# Patient Record
Sex: MALE | Race: WHITE | NOT HISPANIC OR LATINO | URBAN - METROPOLITAN AREA
[De-identification: names, ages, dates, MRNs, and addresses within clinical notes are randomized per-mention and may not be internally consistent; named-entity substitution may affect disease eponyms.]

---

## 2019-05-07 PROBLEM — Z00.00 ENCOUNTER FOR PREVENTIVE HEALTH EXAMINATION: Status: ACTIVE | Noted: 2019-05-07

## 2019-05-09 ENCOUNTER — OUTPATIENT (OUTPATIENT)
Dept: OUTPATIENT SERVICES | Facility: HOSPITAL | Age: 69
LOS: 1 days | End: 2019-05-09
Payer: MEDICARE

## 2019-05-09 DIAGNOSIS — M54.16 RADICULOPATHY, LUMBAR REGION: ICD-10-CM

## 2019-05-09 PROCEDURE — 62323 NJX INTERLAMINAR LMBR/SAC: CPT

## 2019-05-09 PROCEDURE — 77003 FLUOROGUIDE FOR SPINE INJECT: CPT

## 2025-01-07 ENCOUNTER — LABORATORY RESULT (OUTPATIENT)
Age: 75
End: 2025-01-07

## 2025-01-07 ENCOUNTER — APPOINTMENT (OUTPATIENT)
Dept: ORTHOPEDIC SURGERY | Facility: CLINIC | Age: 75
End: 2025-01-07
Payer: MEDICARE

## 2025-01-07 VITALS — BODY MASS INDEX: 27.09 KG/M2 | WEIGHT: 200 LBS | HEIGHT: 72 IN

## 2025-01-07 PROCEDURE — 99204 OFFICE O/P NEW MOD 45 MIN: CPT

## 2025-01-07 PROCEDURE — 73080 X-RAY EXAM OF ELBOW: CPT | Mod: LT

## 2025-01-07 RX ORDER — DOXYCYCLINE HYCLATE 100 MG/1
100 TABLET ORAL TWICE DAILY
Qty: 20 | Refills: 0 | Status: ACTIVE | COMMUNITY
Start: 2025-01-07 | End: 1900-01-01

## 2025-01-09 ENCOUNTER — NON-APPOINTMENT (OUTPATIENT)
Age: 75
End: 2025-01-09

## 2025-01-10 ENCOUNTER — NON-APPOINTMENT (OUTPATIENT)
Age: 75
End: 2025-01-10

## 2025-01-13 ENCOUNTER — NON-APPOINTMENT (OUTPATIENT)
Age: 75
End: 2025-01-13

## 2025-01-13 LAB — GRAM STN ASPIRATE: ABNORMAL

## 2025-01-14 ENCOUNTER — APPOINTMENT (OUTPATIENT)
Dept: ORTHOPEDIC SURGERY | Facility: CLINIC | Age: 75
End: 2025-01-14
Payer: MEDICARE

## 2025-01-14 VITALS — BODY MASS INDEX: 27.09 KG/M2 | WEIGHT: 200 LBS | HEIGHT: 72 IN

## 2025-01-14 PROBLEM — M71.122 SEPTIC OLECRANON BURSITIS OF LEFT ELBOW: Status: ACTIVE | Noted: 2025-01-07

## 2025-01-14 PROCEDURE — 99214 OFFICE O/P EST MOD 30 MIN: CPT | Mod: 25

## 2025-01-14 RX ORDER — DOXYCYCLINE HYCLATE 100 MG/1
100 TABLET ORAL TWICE DAILY
Qty: 28 | Refills: 0 | Status: ACTIVE | COMMUNITY
Start: 2025-01-14 | End: 1900-01-01

## 2025-01-16 ENCOUNTER — NON-APPOINTMENT (OUTPATIENT)
Age: 75
End: 2025-01-16

## 2025-01-21 ENCOUNTER — APPOINTMENT (OUTPATIENT)
Dept: ORTHOPEDIC SURGERY | Facility: CLINIC | Age: 75
End: 2025-01-21
Payer: MEDICARE

## 2025-01-21 VITALS — WEIGHT: 200 LBS | BODY MASS INDEX: 27.09 KG/M2 | HEIGHT: 72 IN

## 2025-01-21 DIAGNOSIS — M71.122 OTHER INFECTIVE BURSITIS, LEFT ELBOW: ICD-10-CM

## 2025-01-21 PROCEDURE — 99214 OFFICE O/P EST MOD 30 MIN: CPT | Mod: 25

## 2025-01-22 ENCOUNTER — OUTPATIENT (OUTPATIENT)
Dept: OUTPATIENT SERVICES | Facility: HOSPITAL | Age: 75
LOS: 1 days | End: 2025-01-22

## 2025-01-22 VITALS
DIASTOLIC BLOOD PRESSURE: 73 MMHG | TEMPERATURE: 97 F | HEIGHT: 72 IN | OXYGEN SATURATION: 100 % | HEART RATE: 84 BPM | SYSTOLIC BLOOD PRESSURE: 106 MMHG | RESPIRATION RATE: 16 BRPM | WEIGHT: 203.93 LBS

## 2025-01-22 DIAGNOSIS — Z96.653 PRESENCE OF ARTIFICIAL KNEE JOINT, BILATERAL: Chronic | ICD-10-CM

## 2025-01-22 DIAGNOSIS — Z96.611 PRESENCE OF RIGHT ARTIFICIAL SHOULDER JOINT: Chronic | ICD-10-CM

## 2025-01-22 DIAGNOSIS — R06.83 SNORING: ICD-10-CM

## 2025-01-22 DIAGNOSIS — M71.122 OTHER INFECTIVE BURSITIS, LEFT ELBOW: ICD-10-CM

## 2025-01-22 DIAGNOSIS — M70.22 OLECRANON BURSITIS, LEFT ELBOW: ICD-10-CM

## 2025-01-22 LAB
HCT VFR BLD CALC: 42.8 % — SIGNIFICANT CHANGE UP (ref 39–50)
HGB BLD-MCNC: 13.9 G/DL — SIGNIFICANT CHANGE UP (ref 13–17)
MCHC RBC-ENTMCNC: 30.6 PG — SIGNIFICANT CHANGE UP (ref 27–34)
MCHC RBC-ENTMCNC: 32.5 G/DL — SIGNIFICANT CHANGE UP (ref 32–36)
MCV RBC AUTO: 94.3 FL — SIGNIFICANT CHANGE UP (ref 80–100)
NRBC # BLD AUTO: 0 K/UL — SIGNIFICANT CHANGE UP (ref 0–0)
NRBC # BLD: 0 /100 WBCS — SIGNIFICANT CHANGE UP (ref 0–0)
NRBC # FLD: 0 K/UL — SIGNIFICANT CHANGE UP (ref 0–0)
NRBC BLD-RTO: 0 /100 WBCS — SIGNIFICANT CHANGE UP (ref 0–0)
PLATELET # BLD AUTO: 190 K/UL — SIGNIFICANT CHANGE UP (ref 150–400)
RBC # BLD: 4.54 M/UL — SIGNIFICANT CHANGE UP (ref 4.2–5.8)
RBC # FLD: 14.2 % — SIGNIFICANT CHANGE UP (ref 10.3–14.5)
WBC # BLD: 13.2 K/UL — HIGH (ref 3.8–10.5)
WBC # FLD AUTO: 13.2 K/UL — HIGH (ref 3.8–10.5)

## 2025-01-22 NOTE — H&P PST ADULT - NEGATIVE NEUROLOGICAL SYMPTOMS
no transient paralysis/no weakness/no generalized seizures/no focal seizures/no syncope/no tremors/no vertigo/no loss of sensation/no difficulty walking/no headache/no loss of consciousness/no hemiparesis/no facial palsy

## 2025-01-22 NOTE — H&P PST ADULT - PROBLEM SELECTOR PLAN 1
Scheduled for left elbow olecranon incision and drainage  Preop instructions provided and patient verbalizes understanding.  Hibiclens and Famotidine provided with instructions.   CBC, results  pending      Hypertension- pt instructed to take Lisinopril-HCTZ and amlodipine am of surgery  Rheumatoid arthritis- Pt will take Duloxetine Pregabalin, methylprednisolone am of surgery

## 2025-01-22 NOTE — H&P PST ADULT - HISTORY OF PRESENT ILLNESS
73yo male Pt c/o swelling and pain to left elbow 4 weeks . Pt  went to Urgent care initally and  received antibiotic (doxycycline).  Pt reports  pain and swelling increase, he then decide to get evaluated by s surgeon. 1/10/2025 elbow aspiration done -Moderate Methicillin Resistant Staphylococcus aureus. Doxycycline was re-prescribed to the patient. and he f/u with surgeon.  Pt then opted for surgery at f/u visit.  Pt Now present in Presurgical testing for preop evaluation for scheduled procedure left elbow Olecranon incision and drainage.   ?

## 2025-01-22 NOTE — H&P PST ADULT - NEGATIVE ENMT SYMPTOMS
no ear pain/no vertigo/no sinus symptoms/no nasal congestion/no nasal discharge/no nasal obstruction/no post-nasal discharge/no nose bleeds/no recurrent cold sores/no abnormal taste sensation/no gum bleeding/no throat pain/no dysphagia

## 2025-01-22 NOTE — H&P PST ADULT - NSICDXPASTMEDICALHX_GEN_ALL_CORE_FT
PAST MEDICAL HISTORY:  Arthritis, rheumatoid     Dyslipidemia     H/O vitamin D deficiency     History of BPH     History of frostbite     Hypertension     Olecranon bursitis, left elbow

## 2025-01-22 NOTE — H&P PST ADULT - NSANTHOSAYNRD_GEN_A_CORE
No. VALERIO screening performed.  STOP BANG Legend: 0-2 = LOW Risk; 3-4 = INTERMEDIATE Risk; 5-8 = HIGH Risk

## 2025-01-22 NOTE — H&P PST ADULT - NSICDXPASTSURGICALHX_GEN_ALL_CORE_FT
PAST SURGICAL HISTORY:  History of bilateral knee replacement     S/P shoulder replacement, right

## 2025-01-22 NOTE — H&P PST ADULT - MUSCULOSKELETAL COMMENTS
left knee replacement  2009, left knee revision 2014, right knee replacement 2017, right shoulder replacement 2015 swelling and redness to left elbow dressing with small yellowish drainage, deformity noted to 1st metatarsal  bilateral hand

## 2025-01-23 ENCOUNTER — TRANSCRIPTION ENCOUNTER (OUTPATIENT)
Age: 75
End: 2025-01-23

## 2025-01-23 ENCOUNTER — OUTPATIENT (OUTPATIENT)
Dept: OUTPATIENT SERVICES | Facility: HOSPITAL | Age: 75
LOS: 1 days | Discharge: HOME | End: 2025-01-23
Payer: MEDICARE

## 2025-01-23 ENCOUNTER — NON-APPOINTMENT (OUTPATIENT)
Age: 75
End: 2025-01-23

## 2025-01-23 ENCOUNTER — APPOINTMENT (OUTPATIENT)
Dept: ORTHOPEDIC SURGERY | Facility: AMBULATORY SURGERY CENTER | Age: 75
End: 2025-01-23

## 2025-01-23 VITALS
TEMPERATURE: 98 F | SYSTOLIC BLOOD PRESSURE: 114 MMHG | DIASTOLIC BLOOD PRESSURE: 59 MMHG | HEART RATE: 82 BPM | RESPIRATION RATE: 16 BRPM | OXYGEN SATURATION: 99 %

## 2025-01-23 VITALS
WEIGHT: 203.93 LBS | RESPIRATION RATE: 16 BRPM | TEMPERATURE: 99 F | OXYGEN SATURATION: 100 % | SYSTOLIC BLOOD PRESSURE: 113 MMHG | HEART RATE: 84 BPM | DIASTOLIC BLOOD PRESSURE: 73 MMHG | HEIGHT: 72 IN

## 2025-01-23 DIAGNOSIS — M71.122 OTHER INFECTIVE BURSITIS, LEFT ELBOW: ICD-10-CM

## 2025-01-23 DIAGNOSIS — Z96.653 PRESENCE OF ARTIFICIAL KNEE JOINT, BILATERAL: Chronic | ICD-10-CM

## 2025-01-23 DIAGNOSIS — Z96.611 PRESENCE OF RIGHT ARTIFICIAL SHOULDER JOINT: Chronic | ICD-10-CM

## 2025-01-23 LAB
GRAM STN FLD: SIGNIFICANT CHANGE UP
HEMOLYSIS INDEX: 5 — SIGNIFICANT CHANGE UP
POTASSIUM SERPL-MCNC: 4 MMOL/L — SIGNIFICANT CHANGE UP (ref 3.5–5.3)
POTASSIUM SERPL-SCNC: 4 MMOL/L — SIGNIFICANT CHANGE UP (ref 3.5–5.3)
SPECIMEN SOURCE: SIGNIFICANT CHANGE UP

## 2025-01-23 PROCEDURE — 88304 TISSUE EXAM BY PATHOLOGIST: CPT | Mod: 26

## 2025-01-23 PROCEDURE — 24105 EXCISION OLECRANON BURSA: CPT | Mod: RT

## 2025-01-23 RX ORDER — DUTASTERIDE 0.5 MG/1
1 CAPSULE, LIQUID FILLED ORAL
Refills: 0 | DISCHARGE

## 2025-01-23 RX ORDER — METHYLPREDNISOLONE ACETATE 40 MG/ML
3 VIAL (ML) INJECTION
Refills: 0 | DISCHARGE

## 2025-01-23 RX ORDER — TAMSULOSIN HYDROCHLORIDE 0.4 MG/1
2 CAPSULE ORAL
Refills: 0 | DISCHARGE

## 2025-01-23 RX ORDER — DOXYCYCLINE HYCLATE 100 MG/1
1 CAPSULE ORAL
Refills: 0 | DISCHARGE

## 2025-01-23 RX ORDER — ASPIRIN 81 MG/1
1 TABLET, COATED ORAL
Refills: 0 | DISCHARGE

## 2025-01-23 RX ORDER — DULOXETINE 20 MG/1
2 CAPSULE, DELAYED RELEASE ORAL
Refills: 0 | DISCHARGE

## 2025-01-23 RX ORDER — AMLODIPINE BESYLATE 5 MG
1 TABLET ORAL
Refills: 0 | DISCHARGE

## 2025-01-23 RX ORDER — PREGABALIN CAPSULES, CV 225 MG/1
1 CAPSULE ORAL
Refills: 0 | DISCHARGE

## 2025-01-23 RX ORDER — LISINOPRIL AND HYDROCHLOROTHIAZIDE 25; 20 MG/1; MG/1
1 TABLET ORAL
Refills: 0 | DISCHARGE

## 2025-01-23 RX ORDER — ROSUVASTATIN CALCIUM 10 MG/1
1 TABLET, FILM COATED ORAL
Refills: 0 | DISCHARGE

## 2025-01-23 NOTE — ASU DISCHARGE PLAN (ADULT/PEDIATRIC) - CARE PROVIDER_API CALL
Karl Abraham  Surgery [facility]  [address], [address]  [place] [address]  Phone: [phone]  Fax: [phone]  Follow Up Time: 2 weeks

## 2025-01-23 NOTE — ASU DISCHARGE PLAN (ADULT/PEDIATRIC) - ASU DC SPECIAL INSTRUCTIONSFT
Please see handout from Dr. Abraham for specific instructions including follow up. If you are supposed to take any specific medications postoperatively, they have already been sent to your pharmacy.

## 2025-01-23 NOTE — ASU DISCHARGE PLAN (ADULT/PEDIATRIC) - NS MD DC FALL RISK RISK
For information on Fall & Injury Prevention, visit: [email or web address] OR  [email or web address] OR  https://www.cdc.gov/steadi/patient.html

## 2025-01-23 NOTE — ASU PREOPERATIVE ASSESSMENT, ADULT (IPARK ONLY) - FALL HARM RISK - RISK INTERVENTIONS

## 2025-01-23 NOTE — ASU DISCHARGE PLAN (ADULT/PEDIATRIC) - FINANCIAL ASSISTANCE
[facility] provides services at a reduced cost to those who are determined to be eligible through [facility]’s financial assistance program. Information regarding [facility]’s financial assistance program can be found by going to [email or web address] or by calling [phone].

## 2025-01-24 LAB
NIGHT BLUE STAIN TISS: SIGNIFICANT CHANGE UP
SPECIMEN SOURCE: SIGNIFICANT CHANGE UP

## 2025-01-26 LAB — GRAM STN FLD: ABNORMAL

## 2025-01-28 LAB
-  CLINDAMYCIN: SIGNIFICANT CHANGE UP
-  DAPTOMYCIN: SIGNIFICANT CHANGE UP
-  ERYTHROMYCIN: SIGNIFICANT CHANGE UP
-  GENTAMICIN: SIGNIFICANT CHANGE UP
-  LINEZOLID: SIGNIFICANT CHANGE UP
-  OXACILLIN: SIGNIFICANT CHANGE UP
-  PENICILLIN: SIGNIFICANT CHANGE UP
-  RIFAMPIN: SIGNIFICANT CHANGE UP
-  TETRACYCLINE: SIGNIFICANT CHANGE UP
-  TRIMETHOPRIM/SULFAMETHOXAZOLE: SIGNIFICANT CHANGE UP
-  VANCOMYCIN: SIGNIFICANT CHANGE UP
CULTURE RESULTS: ABNORMAL
METHOD TYPE: SIGNIFICANT CHANGE UP
ORGANISM # SPEC MICROSCOPIC CNT: ABNORMAL
ORGANISM # SPEC MICROSCOPIC CNT: ABNORMAL
SPECIMEN SOURCE: SIGNIFICANT CHANGE UP
SPECIMEN SOURCE: SIGNIFICANT CHANGE UP

## 2025-01-30 ENCOUNTER — NON-APPOINTMENT (OUTPATIENT)
Age: 75
End: 2025-01-30

## 2025-02-03 LAB — SURGICAL PATHOLOGY STUDY: SIGNIFICANT CHANGE UP

## 2025-02-06 ENCOUNTER — NON-APPOINTMENT (OUTPATIENT)
Age: 75
End: 2025-02-06

## 2025-02-07 ENCOUNTER — APPOINTMENT (OUTPATIENT)
Dept: ORTHOPEDIC SURGERY | Facility: CLINIC | Age: 75
End: 2025-02-07
Payer: MEDICARE

## 2025-02-07 DIAGNOSIS — M71.122 OTHER INFECTIVE BURSITIS, LEFT ELBOW: ICD-10-CM

## 2025-02-07 DIAGNOSIS — A49.02 METHICILLIN RESISTANT STAPHYLOCOCCUS AUREUS INFECTION, UNSPECIFIED SITE: ICD-10-CM

## 2025-02-07 PROBLEM — Z87.438 PERSONAL HISTORY OF OTHER DISEASES OF MALE GENITAL ORGANS: Chronic | Status: ACTIVE | Noted: 2025-01-22

## 2025-02-07 PROBLEM — M70.22 OLECRANON BURSITIS, LEFT ELBOW: Chronic | Status: ACTIVE | Noted: 2025-01-22

## 2025-02-07 LAB — CULTURE RESULTS: ABNORMAL

## 2025-02-07 PROCEDURE — 99024 POSTOP FOLLOW-UP VISIT: CPT

## 2025-02-07 RX ORDER — DOXYCYCLINE HYCLATE 100 MG/1
100 TABLET ORAL TWICE DAILY
Qty: 20 | Refills: 0 | Status: ACTIVE | COMMUNITY
Start: 2025-02-07 | End: 1900-01-01

## 2025-02-08 PROBLEM — Z91.89 OTHER SPECIFIED PERSONAL RISK FACTORS, NOT ELSEWHERE CLASSIFIED: Chronic | Status: ACTIVE | Noted: 2025-01-22

## 2025-02-08 PROBLEM — Z86.39 PERSONAL HISTORY OF OTHER ENDOCRINE, NUTRITIONAL AND METABOLIC DISEASE: Chronic | Status: ACTIVE | Noted: 2025-01-22

## 2025-02-08 PROBLEM — I10 ESSENTIAL (PRIMARY) HYPERTENSION: Chronic | Status: ACTIVE | Noted: 2025-01-22

## 2025-02-08 PROBLEM — M06.9 RHEUMATOID ARTHRITIS, UNSPECIFIED: Chronic | Status: ACTIVE | Noted: 2025-01-22

## 2025-02-08 PROBLEM — E78.5 HYPERLIPIDEMIA, UNSPECIFIED: Chronic | Status: ACTIVE | Noted: 2025-01-22

## 2025-02-13 ENCOUNTER — NON-APPOINTMENT (OUTPATIENT)
Age: 75
End: 2025-02-13

## 2025-02-21 ENCOUNTER — APPOINTMENT (OUTPATIENT)
Dept: ORTHOPEDIC SURGERY | Facility: CLINIC | Age: 75
End: 2025-02-21
Payer: MEDICARE

## 2025-02-21 DIAGNOSIS — M71.122 OTHER INFECTIVE BURSITIS, LEFT ELBOW: ICD-10-CM

## 2025-02-21 PROCEDURE — 99024 POSTOP FOLLOW-UP VISIT: CPT

## 2025-02-24 ENCOUNTER — NON-APPOINTMENT (OUTPATIENT)
Age: 75
End: 2025-02-24

## 2025-07-03 ENCOUNTER — INPATIENT (INPATIENT)
Facility: HOSPITAL | Age: 75
LOS: 0 days | Discharge: HOME | DRG: 125 | End: 2025-07-04
Attending: PSYCHIATRY & NEUROLOGY | Admitting: PSYCHIATRY & NEUROLOGY
Payer: MEDICARE

## 2025-07-03 VITALS
TEMPERATURE: 98 F | HEIGHT: 76 IN | WEIGHT: 203.93 LBS | SYSTOLIC BLOOD PRESSURE: 109 MMHG | OXYGEN SATURATION: 96 % | RESPIRATION RATE: 20 BRPM | HEART RATE: 89 BPM | DIASTOLIC BLOOD PRESSURE: 70 MMHG

## 2025-07-03 DIAGNOSIS — Z96.653 PRESENCE OF ARTIFICIAL KNEE JOINT, BILATERAL: Chronic | ICD-10-CM

## 2025-07-03 DIAGNOSIS — Z96.611 PRESENCE OF RIGHT ARTIFICIAL SHOULDER JOINT: Chronic | ICD-10-CM

## 2025-07-03 DIAGNOSIS — H34.12 CENTRAL RETINAL ARTERY OCCLUSION, LEFT EYE: ICD-10-CM

## 2025-07-03 LAB
ADD ON TEST-SPECIMEN IN LAB: SIGNIFICANT CHANGE UP
ALBUMIN SERPL ELPH-MCNC: 3.8 G/DL — SIGNIFICANT CHANGE UP (ref 3.3–5)
ALP SERPL-CCNC: 81 U/L — SIGNIFICANT CHANGE UP (ref 40–120)
ALT FLD-CCNC: 19 U/L — SIGNIFICANT CHANGE UP (ref 10–45)
ANION GAP SERPL CALC-SCNC: 12 MMOL/L — SIGNIFICANT CHANGE UP (ref 5–17)
APTT BLD: 32.3 SEC — SIGNIFICANT CHANGE UP (ref 26.1–36.8)
AST SERPL-CCNC: 18 U/L — SIGNIFICANT CHANGE UP (ref 10–40)
BASOPHILS # BLD AUTO: 0.07 K/UL — SIGNIFICANT CHANGE UP (ref 0–0.2)
BASOPHILS NFR BLD AUTO: 1.2 % — SIGNIFICANT CHANGE UP (ref 0–2)
BILIRUB SERPL-MCNC: 0.5 MG/DL — SIGNIFICANT CHANGE UP (ref 0.2–1.2)
BUN SERPL-MCNC: 8 MG/DL — SIGNIFICANT CHANGE UP (ref 7–23)
CALCIUM SERPL-MCNC: 9.3 MG/DL — SIGNIFICANT CHANGE UP (ref 8.4–10.5)
CHLORIDE SERPL-SCNC: 107 MMOL/L — SIGNIFICANT CHANGE UP (ref 96–108)
CO2 SERPL-SCNC: 22 MMOL/L — SIGNIFICANT CHANGE UP (ref 22–31)
CREAT SERPL-MCNC: 0.82 MG/DL — SIGNIFICANT CHANGE UP (ref 0.5–1.3)
EGFR: 93 ML/MIN/1.73M2 — SIGNIFICANT CHANGE UP
EGFR: 93 ML/MIN/1.73M2 — SIGNIFICANT CHANGE UP
EOSINOPHIL # BLD AUTO: 0.28 K/UL — SIGNIFICANT CHANGE UP (ref 0–0.5)
EOSINOPHIL NFR BLD AUTO: 4.9 % — SIGNIFICANT CHANGE UP (ref 0–6)
GLUCOSE SERPL-MCNC: 110 MG/DL — HIGH (ref 70–99)
HCT VFR BLD CALC: 42.4 % — SIGNIFICANT CHANGE UP (ref 39–50)
HGB BLD-MCNC: 13.8 G/DL — SIGNIFICANT CHANGE UP (ref 13–17)
IMM GRANULOCYTES # BLD AUTO: 0.01 K/UL — SIGNIFICANT CHANGE UP (ref 0–0.07)
IMM GRANULOCYTES NFR BLD AUTO: 0.2 % — SIGNIFICANT CHANGE UP (ref 0–0.9)
INR BLD: 1.04 RATIO — SIGNIFICANT CHANGE UP (ref 0.85–1.16)
LYMPHOCYTES # BLD AUTO: 1.78 K/UL — SIGNIFICANT CHANGE UP (ref 1–3.3)
LYMPHOCYTES NFR BLD AUTO: 31.3 % — SIGNIFICANT CHANGE UP (ref 13–44)
MCHC RBC-ENTMCNC: 29.9 PG — SIGNIFICANT CHANGE UP (ref 27–34)
MCHC RBC-ENTMCNC: 32.5 G/DL — SIGNIFICANT CHANGE UP (ref 32–36)
MCV RBC AUTO: 92 FL — SIGNIFICANT CHANGE UP (ref 80–100)
MONOCYTES # BLD AUTO: 0.54 K/UL — SIGNIFICANT CHANGE UP (ref 0–0.9)
MONOCYTES NFR BLD AUTO: 9.5 % — SIGNIFICANT CHANGE UP (ref 2–14)
NEUTROPHILS # BLD AUTO: 3.01 K/UL — SIGNIFICANT CHANGE UP (ref 1.8–7.4)
NEUTROPHILS NFR BLD AUTO: 52.9 % — SIGNIFICANT CHANGE UP (ref 43–77)
NRBC # BLD AUTO: 0 K/UL — SIGNIFICANT CHANGE UP (ref 0–0)
NRBC # FLD: 0 K/UL — SIGNIFICANT CHANGE UP (ref 0–0)
NRBC BLD AUTO-RTO: 0 /100 WBCS — SIGNIFICANT CHANGE UP (ref 0–0)
PLATELET # BLD AUTO: 211 K/UL — SIGNIFICANT CHANGE UP (ref 150–400)
PMV BLD: 10.5 FL — SIGNIFICANT CHANGE UP (ref 7–13)
POTASSIUM SERPL-MCNC: 3.5 MMOL/L — SIGNIFICANT CHANGE UP (ref 3.5–5.3)
POTASSIUM SERPL-SCNC: 3.5 MMOL/L — SIGNIFICANT CHANGE UP (ref 3.5–5.3)
PROT SERPL-MCNC: 6.6 G/DL — SIGNIFICANT CHANGE UP (ref 6–8.3)
PROTHROM AB SERPL-ACNC: 11.9 SEC — SIGNIFICANT CHANGE UP (ref 9.9–13.4)
RBC # BLD: 4.61 M/UL — SIGNIFICANT CHANGE UP (ref 4.2–5.8)
RBC # FLD: 12.8 % — SIGNIFICANT CHANGE UP (ref 10.3–14.5)
SODIUM SERPL-SCNC: 141 MMOL/L — SIGNIFICANT CHANGE UP (ref 135–145)
TROPONIN T, HIGH SENSITIVITY RESULT: 37 NG/L — SIGNIFICANT CHANGE UP (ref 0–51)
WBC # BLD: 5.69 K/UL — SIGNIFICANT CHANGE UP (ref 3.8–10.5)
WBC # FLD AUTO: 5.69 K/UL — SIGNIFICANT CHANGE UP (ref 3.8–10.5)

## 2025-07-03 PROCEDURE — 70496 CT ANGIOGRAPHY HEAD: CPT

## 2025-07-03 PROCEDURE — 84132 ASSAY OF SERUM POTASSIUM: CPT

## 2025-07-03 PROCEDURE — 84295 ASSAY OF SERUM SODIUM: CPT

## 2025-07-03 PROCEDURE — 85025 COMPLETE CBC W/AUTO DIFF WBC: CPT

## 2025-07-03 PROCEDURE — 86140 C-REACTIVE PROTEIN: CPT

## 2025-07-03 PROCEDURE — 0042T: CPT

## 2025-07-03 PROCEDURE — 85014 HEMATOCRIT: CPT

## 2025-07-03 PROCEDURE — 82435 ASSAY OF BLOOD CHLORIDE: CPT

## 2025-07-03 PROCEDURE — 82330 ASSAY OF CALCIUM: CPT

## 2025-07-03 PROCEDURE — 84484 ASSAY OF TROPONIN QUANT: CPT

## 2025-07-03 PROCEDURE — 70496 CT ANGIOGRAPHY HEAD: CPT | Mod: 26

## 2025-07-03 PROCEDURE — 80053 COMPREHEN METABOLIC PANEL: CPT

## 2025-07-03 PROCEDURE — 70498 CT ANGIOGRAPHY NECK: CPT | Mod: 26

## 2025-07-03 PROCEDURE — 82962 GLUCOSE BLOOD TEST: CPT

## 2025-07-03 PROCEDURE — 85610 PROTHROMBIN TIME: CPT

## 2025-07-03 PROCEDURE — 82803 BLOOD GASES ANY COMBINATION: CPT

## 2025-07-03 PROCEDURE — 83605 ASSAY OF LACTIC ACID: CPT

## 2025-07-03 PROCEDURE — 70450 CT HEAD/BRAIN W/O DYE: CPT | Mod: 26,XU

## 2025-07-03 PROCEDURE — 70498 CT ANGIOGRAPHY NECK: CPT

## 2025-07-03 PROCEDURE — 70450 CT HEAD/BRAIN W/O DYE: CPT

## 2025-07-03 PROCEDURE — 99285 EMERGENCY DEPT VISIT HI MDM: CPT

## 2025-07-03 PROCEDURE — 82947 ASSAY GLUCOSE BLOOD QUANT: CPT

## 2025-07-03 PROCEDURE — 85018 HEMOGLOBIN: CPT

## 2025-07-03 PROCEDURE — 85730 THROMBOPLASTIN TIME PARTIAL: CPT

## 2025-07-03 RX ORDER — DULOXETINE 20 MG/1
120 CAPSULE, DELAYED RELEASE ORAL
Refills: 0 | Status: DISCONTINUED | OUTPATIENT
Start: 2025-07-03 | End: 2025-07-04

## 2025-07-03 RX ORDER — TAMSULOSIN HYDROCHLORIDE 0.4 MG/1
0.8 CAPSULE ORAL AT BEDTIME
Refills: 0 | Status: DISCONTINUED | OUTPATIENT
Start: 2025-07-03 | End: 2025-07-04

## 2025-07-03 RX ORDER — PREGABALIN 50 MG/1
200 CAPSULE ORAL
Refills: 0 | Status: DISCONTINUED | OUTPATIENT
Start: 2025-07-03 | End: 2025-07-04

## 2025-07-03 RX ORDER — ATORVASTATIN CALCIUM 80 MG/1
80 TABLET, FILM COATED ORAL AT BEDTIME
Refills: 0 | Status: DISCONTINUED | OUTPATIENT
Start: 2025-07-03 | End: 2025-07-04

## 2025-07-03 RX ORDER — ENOXAPARIN SODIUM 100 MG/ML
40 INJECTION SUBCUTANEOUS EVERY 24 HOURS
Refills: 0 | Status: DISCONTINUED | OUTPATIENT
Start: 2025-07-03 | End: 2025-07-04

## 2025-07-03 RX ORDER — ASPIRIN 325 MG
81 TABLET ORAL DAILY
Refills: 0 | Status: DISCONTINUED | OUTPATIENT
Start: 2025-07-03 | End: 2025-07-04

## 2025-07-03 NOTE — H&P ADULT - ATTENDING COMMENTS
Impression: Left eye vision loss–persistent, likely secondary to left central retinal artery occlusion  Past medical history significant for CAD s/p stent 3 weeks prior  Currently on dual antiplatelet  Vascular imaging significant for bilateral carotid severe calcification, right ICA 50 to 60% stenosis and left ICA moderate–40 to 50% stenosis  High-dose statins  On CTA there is findings suggestive of left P3 clot, no obvious atrial fibrillation on telemetry  MRI can be done inpatient or outpatient as  secondary stroke prevention strategy will remain the same as above  Suggest close follow-up with ophthalmology, cardiology and stroke neurology as outpatient.

## 2025-07-03 NOTE — ED ADULT TRIAGE NOTE - BEFAST FACE
Patient refer by Dr Tijerina due to external hemorrhoids that itch and hurts on and off for a yr and she wants them removed. She has used preparation H and something else OTC but continued to itch and hurt. No bleeding with BM.  No constipation or diarrhea. No pain with BM. She is having form stools daily.   colonoscopy 11/2022: Diverticulosis in the sigmoid colon and in the descending colon. - Erythematous mucosa in the sigmoid colon. Biopsied. No

## 2025-07-03 NOTE — CONSULT NOTE ADULT - SUBJECTIVE AND OBJECTIVE BOX
[facility] DEPARTMENT OF OPHTHALMOLOGY - INITIAL ADULT CONSULT  -----------------------------------------------------------------------------  Grupo Mahajan MD, PGY-3  Contact: TEAMS  -----------------------------------------------------------------------------    HPI: 74M w/ hx of Rheumatoid arthritis and resultant peripheral neuropathy, CAD s/p stent 3 weeks prior, and HLD presenting as code stroke for vision loss. Patient reports waking up fine at 8AM by 9AM he was unable to see from his left eye which was not improving. Right eye unaffected. Otherwise patient denies any other weakness, sensory changes, discoordination. Of note the patient had a cardiac stent placed 3 weeks ago and has been taking Aspirin + Plavix. Reports that he has a known history of prior chronic infarct that was discovered ~6 months prior incidentally which began his current extensive cardiac workup.    PAST MEDICAL & SURGICAL HISTORY:  Dyslipidemia  History of BPH  H/O vitamin D deficiency  Arthritis, rheumatoid  Hypertension  History of frostbite  Olecranon bursitis, left elbow  History of bilateral knee replacement  S/P shoulder replacement, right    Interval History: Vision loss around 8-9AM this morning    PMH: As above  POcHx: denies surg/laser  FH: denies glc/amd  Social History: denies etoh/tobacco  Ophthalmic Medications: none  Allergies: NKDA    Review of Systems:  Constitutional: No fever, chills  Eyes: No blurry vision, flashes, floaters, FBS, erythema, discharge, double vision, OU  Neuro: No tremors  Cardiovascular: No chest pain, palpitations  Respiratory: No SOB, no cough  GI: No nausea, vomiting, abdominal pain  : No dysuria  Skin: no rash  Psych: no depression  Endocrine: no polyuria, polydipsia  Heme/lymph: no swelling    VITALS: T(C): 36.7 (07-03-25 @ 20:16)  T(F): 98 (07-03-25 @ 20:16), Max: 98 (07-03-25 @ 20:16)  HR: 87 (07-03-25 @ 20:16) (87 - 89)  BP: 154/80 (07-03-25 @ 20:16) (109/70 - 154/80)  RR:  (19 - 20)  SpO2:  (96% - 97%)  Wt(kg): --  General: AAO x 3, appropriate mood and affect    Ophthalmology Exam:  Visual acuity (sc): 20/40 pH 20/30 OD, HM temporally OS, otherwise LP OS  Pupils: + APD OS  Ttono: 17 OD 14 OS  Extraocular movements (EOMs): Full OU, no pain, no diplopia  Confrontational Visual Field (CVF): Full OD, Full OS  Color Plates: 12/12 OD, 12/12 OS    Slit Lamp Exam (PLE)  External: Flat OU  Lids/Lashes/Lacrimal Ducts: Flat OU    Sclera/Conjunctiva: W+Q OU  Cornea: Cl OU  Anterior Chamber: D+F OU    Iris: Flat OU  Lens: Clear in visual axis OU    Fundus Exam: dilated with 1% tropicamide and 2.5% phenylephrine  Approval obtained from primary team for dilation  Patient aware that pupils can remained dilated for at least 4-6 hours  Exam performed with 20D lens    Vitreous: wnl OU  Disc, cup/disc: sharp and pink, 0.3 OU  Macula: Flat OD, Retinal whitening in peripapillary area with cherry red macula OS  Vessels: Normal caliber OU  Periphery: flat OU    Labs/Imaging:  OCT with nasal thinning of retinal layers    [facility] DEPARTMENT OF OPHTHALMOLOGY - INITIAL ADULT CONSULT  -----------------------------------------------------------------------------  Grupo Mahajan MD, PGY-3  Contact: TEAMS  -----------------------------------------------------------------------------    HPI: 74M w/ hx of Rheumatoid arthritis and resultant peripheral neuropathy, CAD s/p stent 3 weeks prior, and HLD presenting as code stroke for vision loss. Patient reports waking up fine at 8AM by 9AM he was unable to see from his left eye which was not improving. Right eye unaffected. Otherwise patient denies any other weakness, sensory changes, discoordination. Of note the patient had a cardiac stent placed 3 weeks ago and has been taking Aspirin + Plavix. Reports that he has a known history of prior chronic infarct that was discovered ~6 months prior incidentally which began his current extensive cardiac workup.    PAST MEDICAL & SURGICAL HISTORY:  Dyslipidemia  History of BPH  H/O vitamin D deficiency  Arthritis, rheumatoid  Hypertension  History of frostbite  Olecranon bursitis, left elbow  History of bilateral knee replacement  S/P shoulder replacement, right    Interval History: Vision loss around 8-9AM this morning    PMH: As above  POcHx: denies surg/laser  FH: denies glc/amd  Social History: denies etoh/tobacco  Ophthalmic Medications: none  Allergies: NKDA    Review of Systems:  Constitutional: No fever, chills  Eyes: No blurry vision, flashes, floaters, FBS, erythema, discharge, double vision, OU  Neuro: No tremors  Cardiovascular: No chest pain, palpitations  Respiratory: No SOB, no cough  GI: No nausea, vomiting, abdominal pain  : No dysuria  Skin: no rash  Psych: no depression  Endocrine: no polyuria, polydipsia  Heme/lymph: no swelling    VITALS: T(C): 36.7 (07-03-25 @ 20:16)  T(F): 98 (07-03-25 @ 20:16), Max: 98 (07-03-25 @ 20:16)  HR: 87 (07-03-25 @ 20:16) (87 - 89)  BP: 154/80 (07-03-25 @ 20:16) (109/70 - 154/80)  RR:  (19 - 20)  SpO2:  (96% - 97%)  Wt(kg): --  General: AAO x 3, appropriate mood and affect    Ophthalmology Exam:  Visual acuity (sc): 20/40 pH 20/30 OD, HM temporally OS, otherwise LP OS  Pupils: + APD OS  Ttono: 17 OD 14 OS  Extraocular movements (EOMs): Full OU, no pain, no diplopia  Confrontational Visual Field (CVF): Full OD, Full OS  Color Plates: 12/12 OD, 12/12 OS    Slit Lamp Exam (PLE)  External: Flat OU  Lids/Lashes/Lacrimal Ducts: Flat OU    Sclera/Conjunctiva: W+Q OU  Cornea: Cl OU  Anterior Chamber: D+F OU    Iris: Iris atrophy OU  Lens: Clear in visual axis OU    Fundus Exam: dilated with 1% tropicamide and 2.5% phenylephrine  Approval obtained from primary team for dilation  Patient aware that pupils can remained dilated for at least 4-6 hours  Exam performed with 20D lens    Vitreous: wnl OU  Disc, cup/disc: sharp and pink, 0.3 OU  Macula: Flat OD, Retinal whitening in posterior pole with cherry red macula OS  Vessels: Normal caliber OD, mild attenuation OS in superior arcade  Periphery: flat OU    Labs/Imaging:  OCT with nasal thinning of retinal layers    [facility] DEPARTMENT OF OPHTHALMOLOGY - INITIAL ADULT CONSULT  -----------------------------------------------------------------------------  Grupo Mahajan MD, PGY-3  Contact: TEAMS  -----------------------------------------------------------------------------    HPI: 74M w/ hx of Rheumatoid arthritis and resultant peripheral neuropathy, CAD s/p stent 3 weeks prior, and HLD presenting as code stroke for vision loss. Patient reports waking up fine at 8AM by 9AM he was unable to see from his left eye which was not improving. Right eye unaffected. Otherwise patient denies any other weakness, sensory changes, discoordination. Of note the patient had a cardiac stent placed 3 weeks ago and has been taking Aspirin + Plavix. Reports that he has a known history of prior chronic infarct that was discovered ~6 months prior incidentally which began his current extensive cardiac workup.    PAST MEDICAL & SURGICAL HISTORY:  Dyslipidemia  History of BPH  H/O vitamin D deficiency  Arthritis, rheumatoid  Hypertension  History of frostbite  Olecranon bursitis, left elbow  History of bilateral knee replacement  S/P shoulder replacement, right    Interval History: Vision loss around 8-9AM this morning    PMH: As above  POcHx: denies surg/laser  FH: denies glc/amd  Social History: denies etoh/tobacco  Ophthalmic Medications: none  Allergies: NKDA    Review of Systems:  Constitutional: No fever, chills  Eyes: No blurry vision, flashes, floaters, FBS, erythema, discharge, double vision, OU  Neuro: No tremors  Cardiovascular: No chest pain, palpitations  Respiratory: No SOB, no cough  GI: No nausea, vomiting, abdominal pain  : No dysuria  Skin: no rash  Psych: no depression  Endocrine: no polyuria, polydipsia  Heme/lymph: no swelling    VITALS: T(C): 36.7 (07-03-25 @ 20:16)  T(F): 98 (07-03-25 @ 20:16), Max: 98 (07-03-25 @ 20:16)  HR: 87 (07-03-25 @ 20:16) (87 - 89)  BP: 154/80 (07-03-25 @ 20:16) (109/70 - 154/80)  RR:  (19 - 20)  SpO2:  (96% - 97%)  Wt(kg): --  General: AAO x 3, appropriate mood and affect    Ophthalmology Exam:  Visual acuity (sc): 20/40 pH 20/30 OD, CF temporally OS, otherwise LP OS  Pupils: + APD OS  Ttono: 17 OD 14 OS  Extraocular movements (EOMs): Full OU, no pain, no diplopia  Confrontational Visual Field (CVF): Full OD, Full OS  Color Plates: 12/12 OD, 12/12 OS    Slit Lamp Exam (PLE)  External: Flat OU  Lids/Lashes/Lacrimal Ducts: Flat OU    Sclera/Conjunctiva: W+Q OU  Cornea: Cl OU  Anterior Chamber: D+F OU    Iris: Iris atrophy OU  Lens: Clear in visual axis OU    Fundus Exam: dilated with 1% tropicamide and 2.5% phenylephrine  Approval obtained from primary team for dilation  Patient aware that pupils can remained dilated for at least 4-6 hours  Exam performed with 20D lens    Vitreous: wnl OU  Disc, cup/disc: sharp and pink, 0.3 OU  Macula: Flat OD, Retinal whitening in posterior pole with cherry red macula OS  Vessels: Normal caliber OD, mild attenuation OS in superior arcade  Periphery: flat OU    Labs/Imaging:  OCT with nasal thinning of retinal layers

## 2025-07-03 NOTE — H&P ADULT - NSHPLABSRESULTS_GEN_ALL_CORE
13.8   5.69  )-----------( 211      ( 03 Jul 2025 20:16 )             42.4       07-03    141  |  107  |  8   ----------------------------<  110[H]  3.5   |  22  |  0.82    Ca    9.3      03 Jul 2025 20:16    TPro  6.6  /  Alb  3.8  /  TBili  0.5  /  DBili  x   /  AST  18  /  ALT  19  /  AlkPhos  81  07-03              Urinalysis Basic - ( 03 Jul 2025 20:16 )    Color: x / Appearance: x / SG: x / pH: x  Gluc: 110 mg/dL / Ketone: x  / Bili: x / Urobili: x   Blood: x / Protein: x / Nitrite: x   Leuk Esterase: x / RBC: x / WBC x   Sq Epi: x / Non Sq Epi: x / Bacteria: x        PT/INR - ( 03 Jul 2025 20:16 )   PT: 11.9 sec;   INR: 1.04 ratio         PTT - ( 03 Jul 2025 20:16 )  PTT:32.3 sec    Lactate Trend            CAPILLARY BLOOD GLUCOSE      POCT Blood Glucose.: 113 mg/dL (03 Jul 2025 19:54)          Personal interpretation EKG:

## 2025-07-03 NOTE — CONSULT NOTE ADULT - SUBJECTIVE AND OBJECTIVE BOX
HPI:  74M w/ hx of Rheumatoid arthritis and resultant peripheral neuropathy, CAD s/p stent 3 weeks prior, and HLD presenting as code stroke for vision loss. Patient reports waking up fine at 8AM by 9AM he was unable to see from his right eye which was not improving. Otherwise patient denies any other weakness, sensory changes, discoordination. Of note the patient had a cardiac stent placed 3 weeks ago and has been     PAST MEDICAL & SURGICAL HISTORY:  Dyslipidemia      History of BPH      H/O vitamin D deficiency      Arthritis, rheumatoid      Hypertension      History of frostbite      Olecranon bursitis, left elbow      History of bilateral knee replacement      S/P shoulder replacement, right          Home Medications:  aspirin 81 mg oral tablet: 1 tab(s) orally once a day PM (23 Jan 2025 07:52)  D3 5,000+ K 1 cap PO daily PM:  (23 Jan 2025 07:52)  doxycycline hyclate 100 mg oral capsule: 1 cap(s) orally 2 times a day (23 Jan 2025 07:52)  DULoxetine 60 mg oral delayed release capsule: 2 cap(s) orally once a day AM (23 Jan 2025 07:52)  dutasteride 0.5 mg oral capsule: 1 cap(s) orally once a day PM (23 Jan 2025 07:52)  lisinopril-hydrochlorothiazide 20 mg-25 mg oral tablet: 1 tab(s) orally once a day AM (23 Jan 2025 07:52)  methylPREDNISolone 4 mg oral tablet: 3 tab(s) orally once a day AM (23 Jan 2025 07:52)  Norvasc 10 mg oral tablet: 1 tab(s) orally once a day AM (23 Jan 2025 07:52)  pregabalin 200 mg oral capsule: 1 cap(s) orally 2 times a day (23 Jan 2025 07:52)  rosuvastatin 10 mg oral capsule: 1 cap(s) orally once a day pm (23 Jan 2025 07:52)  tamsulosin 0.4 mg oral capsule: 2 cap(s) orally once a day 30 minutes after dinner (23 Jan 2025 07:52)  vitamin B complex GC  1 CAP PO daily PM:  (23 Jan 2025 07:52)      Allergies    No Known Allergies    Intolerances        FAMILY HISTORY:      Social History:      REVIEW OF SYSTEMS:  CONSTITUTIONAL: No fever, weight loss  EYES: No eye pain, visual disturbances, or discharge  ENMT:  No difficulty hearing, tinnitus, vertigo; No throat pain  NECK: No pain or stiffness  RESPIRATORY: No cough, wheezing, or hemoptysis; No dyspnea  CARDIOVASCULAR: No chest pain, palpitations, dizziness, or leg swelling  GASTROINTESTINAL: No abdominal pain. No nausea, vomiting, or hematemesis; No diarrhea or constipation. No melena or hematochezia.  GENITOURINARY: No dysuria, frequency, or hematuria  NEUROLOGICAL: No headaches, memory loss, numbness, or tremors  SKIN: No itching, burning, rashes, or lesions   LYMPH NODES: No enlarged glands  ENDOCRINE: No heat or cold intolerance;  MUSCULOSKELETAL: No joint pain or swelling;   PSYCHIATRIC: No mood swings, or difficulty sleeping  HEME/LYMPH: No easy bruising, or bleeding gums  ALLERGY AND IMMUNOLOGIC: No hives or eczema    CURRENT MEDICATIONS:       VITAL SIGNS, INS/OUTS (last 24 hours):  Vital Signs Last 24 Hrs  T(C): 36.6 (03 Jul 2025 19:47), Max: 36.6 (03 Jul 2025 19:47)  T(F): 97.9 (03 Jul 2025 19:47), Max: 97.9 (03 Jul 2025 19:47)  HR: 89 (03 Jul 2025 19:47) (89 - 89)  BP: 109/70 (03 Jul 2025 19:47) (109/70 - 109/70)  BP(mean): --  RR: 20 (03 Jul 2025 19:47) (20 - 20)  SpO2: 96% (03 Jul 2025 19:47) (96% - 96%)    Parameters below as of 03 Jul 2025 19:47  Patient On (Oxygen Delivery Method): room air      I&O's Summary      PHYSICAL EXAM:  Gen: Reclining in bed at time of exam, appears stated age  HEENT: NCAT, MMM, clear OP  Neck: supple, trachea at midline  CV: RRR, +S1/S2  Pulm: adequate respiratory effort, no increase in work of breathing  Abd: soft, NTND  Skin: warm and dry, no new rashes vs prior report  Ext: WWP, no LE edema  Neuro: AOx3, no gross focal neurological deficits  Psych: affect and behavior appropriate, pleasant at time of interview    BASIC LABS:              CAPILLARY BLOOD GLUCOSE      POCT Blood Glucose.: 113 mg/dL (03 Jul 2025 19:54)      OTHER LABS:        MICRODATA:      IMAGING:    EKG:    #Diet - Diet, NPO (ED use only) (07-03-25 @ 20:00) [Active]        #DVT PPx -  #Dispo -  HPI:  74M w/ hx of Rheumatoid arthritis and resultant peripheral neuropathy, CAD s/p stent 3 weeks prior, and HLD presenting as code stroke for vision loss. Patient reports waking up fine at 8AM by 9AM he was unable to see from his left eye which was not improving. Right eye unaffected. Otherwise patient denies any other weakness, sensory changes, discoordination. Of note the patient had a cardiac stent placed 3 weeks ago and has been taking Aspirin + Plavix. No prior history of stroke.    PAST MEDICAL & SURGICAL HISTORY:  Dyslipidemia  History of BPH  H/O vitamin D deficiency  Arthritis, rheumatoid  Hypertension  History of frostbite  Olecranon bursitis, left elbow  History of bilateral knee replacement  S/P shoulder replacement, right      Home Medications:  aspirin 81 mg oral tablet: 1 tab(s) orally once a day PM (23 Jan 2025 07:52)  D3 5,000+ K 1 cap PO daily PM:  (23 Jan 2025 07:52)  doxycycline hyclate 100 mg oral capsule: 1 cap(s) orally 2 times a day (23 Jan 2025 07:52)  DULoxetine 60 mg oral delayed release capsule: 2 cap(s) orally once a day AM (23 Jan 2025 07:52)  dutasteride 0.5 mg oral capsule: 1 cap(s) orally once a day PM (23 Jan 2025 07:52)  lisinopril-hydrochlorothiazide 20 mg-25 mg oral tablet: 1 tab(s) orally once a day AM (23 Jan 2025 07:52)  methylPREDNISolone 4 mg oral tablet: 3 tab(s) orally once a day AM (23 Jan 2025 07:52)  Norvasc 10 mg oral tablet: 1 tab(s) orally once a day AM (23 Jan 2025 07:52)  pregabalin 200 mg oral capsule: 1 cap(s) orally 2 times a day (23 Jan 2025 07:52)  rosuvastatin 10 mg oral capsule: 1 cap(s) orally once a day pm (23 Jan 2025 07:52)  tamsulosin 0.4 mg oral capsule: 2 cap(s) orally once a day 30 minutes after dinner (23 Jan 2025 07:52)  vitamin B complex GC  1 CAP PO daily PM:  (23 Jan 2025 07:52)      Allergies  No Known Allergies  Intolerances        FAMILY HISTORY:      Social History:      REVIEW OF SYSTEMS:  As per HPI    CURRENT MEDICATIONS:       VITAL SIGNS, INS/OUTS (last 24 hours):  Vital Signs Last 24 Hrs  T(C): 36.6 (03 Jul 2025 19:47), Max: 36.6 (03 Jul 2025 19:47)  T(F): 97.9 (03 Jul 2025 19:47), Max: 97.9 (03 Jul 2025 19:47)  HR: 89 (03 Jul 2025 19:47) (89 - 89)  BP: 109/70 (03 Jul 2025 19:47) (109/70 - 109/70)  BP(mean): --  RR: 20 (03 Jul 2025 19:47) (20 - 20)  SpO2: 96% (03 Jul 2025 19:47) (96% - 96%)    Parameters below as of 03 Jul 2025 19:47  Patient On (Oxygen Delivery Method): room air      I&O's Summary      PHYSICAL EXAM:  Gen: sitting in wheelchair, no acute distress, answering questions appropriately  HEENT: NCAT, MMM,  Neck: supple, trachea at midline  Pulm: adequate respiratory effort, no increase in work of breathing  Abd: soft, NTND  Skin: warm and dry, no new rashes vs prior report  Ext: WWP, no LE edema  Neuro: AOx3, no gross focal neurological deficits, left monocular vision loss, right eye visual fields intact, no APD, finger-to-nose and heel-to-shin intact, naming intact      BASIC LABS:              CAPILLARY BLOOD GLUCOSE      POCT Blood Glucose.: 113 mg/dL (03 Jul 2025 19:54)      OTHER LABS:        MICRODATA:      IMAGING:    EKG:    #Diet - Diet, NPO (ED use only) (07-03-25 @ 20:00) [Active]        #DVT PPx -  #Dispo -

## 2025-07-03 NOTE — ED PROVIDER NOTE - ATTENDING CONTRIBUTION TO CARE
74M w/ hx of Rheumatoid arthritis and resultant peripheral neuropathy, CAD s/p stent 3 weeks prior, and HLD presenting as code stroke for vision loss since 8 am today l eye.  Patient was made a code stroke due to this high complaints otherwise neurologically intact at this time with a nonfocal neuroexam CT imaging was performed ophthalmology was consulted possible central renal artery occlusion ophthalmology is currently at the bedside to take to the eye room for further evaluation pending imaging labs at this time. pt currently on dapt already due to recent stent.

## 2025-07-03 NOTE — H&P ADULT - HISTORY OF PRESENT ILLNESS
HPI:  74M w/ hx of Rheumatoid arthritis and resultant peripheral neuropathy, CAD s/p stent 3 weeks prior, and HLD presenting as code stroke for vision loss. Patient reports waking up fine at 8AM by 9AM he was unable to see from his left eye which was not improving. Right eye unaffected. Otherwise patient denies any other weakness, sensory changes, discoordination. Of note the patient had a cardiac stent placed 3 weeks ago and has been taking Aspirin + Plavix. Reports that he has a known history of prior chronic infarct.    PAST MEDICAL & SURGICAL HISTORY:  Dyslipidemia  History of BPH  H/O vitamin D deficiency  Arthritis, rheumatoid  Hypertension  History of frostbite  Olecranon bursitis, left elbow  History of bilateral knee replacement  S/P shoulder replacement, right      Home Medications:  aspirin 81 mg oral tablet: 1 tab(s) orally once a day PM (23 Jan 2025 07:52)  D3 5,000+ K 1 cap PO daily PM:  (23 Jan 2025 07:52)  doxycycline hyclate 100 mg oral capsule: 1 cap(s) orally 2 times a day (23 Jan 2025 07:52)  DULoxetine 60 mg oral delayed release capsule: 2 cap(s) orally once a day AM (23 Jan 2025 07:52)  dutasteride 0.5 mg oral capsule: 1 cap(s) orally once a day PM (23 Jan 2025 07:52)  lisinopril-hydrochlorothiazide 20 mg-25 mg oral tablet: 1 tab(s) orally once a day AM (23 Jan 2025 07:52)  methylPREDNISolone 4 mg oral tablet: 3 tab(s) orally once a day AM (23 Jan 2025 07:52)  Norvasc 10 mg oral tablet: 1 tab(s) orally once a day AM (23 Jan 2025 07:52)  pregabalin 200 mg oral capsule: 1 cap(s) orally 2 times a day (23 Jan 2025 07:52)  rosuvastatin 10 mg oral capsule: 1 cap(s) orally once a day pm (23 Jan 2025 07:52)  tamsulosin 0.4 mg oral capsule: 2 cap(s) orally once a day 30 minutes after dinner (23 Jan 2025 07:52)  vitamin B complex GC  1 CAP PO daily PM:  (23 Jan 2025 07:52)      Allergies  No Known Allergies  Intolerances        FAMILY HISTORY:      Social History:      REVIEW OF SYSTEMS:  As per HPI    CURRENT MEDICATIONS:       VITAL SIGNS, INS/OUTS (last 24 hours):  Vital Signs Last 24 Hrs  T(C): 36.6 (03 Jul 2025 19:47), Max: 36.6 (03 Jul 2025 19:47)  T(F): 97.9 (03 Jul 2025 19:47), Max: 97.9 (03 Jul 2025 19:47)  HR: 89 (03 Jul 2025 19:47) (89 - 89)  BP: 109/70 (03 Jul 2025 19:47) (109/70 - 109/70)  BP(mean): --  RR: 20 (03 Jul 2025 19:47) (20 - 20)  SpO2: 96% (03 Jul 2025 19:47) (96% - 96%)    Parameters below as of 03 Jul 2025 19:47  Patient On (Oxygen Delivery Method): room air      I&O's Summary      PHYSICAL EXAM:  Gen: sitting in wheelchair, no acute distress, answering questions appropriately  HEENT: NCAT, MMM,  Neck: supple, trachea at midline  Pulm: adequate respiratory effort, no increase in work of breathing  Abd: soft, NTND  Skin: warm and dry, no new rashes vs prior report  Ext: WWP, no LE edema  Neuro: AOx3, no gross focal neurological deficits, left monocular vision loss with some preserved superior temporal visual fieldse visual fields intact, no APD, finger-to-nose and heel-to-shin intact, naming intact, +left APD      BASIC LABS:                          13.8   5.69  )-----------( 211      ( 03 Jul 2025 20:16 )             42.4   07-03    141  |  107  |  8   ----------------------------<  110[H]  3.5   |  22  |  0.82    Ca    9.3      03 Jul 2025 20:16    TPro  6.6  /  Alb  3.8  /  TBili  0.5  /  DBili  x   /  AST  18  /  ALT  19  /  AlkPhos  81  07-03  PT/INR - ( 03 Jul 2025 20:16 )   PT: 11.9 sec;   INR: 1.04 ratio         PTT - ( 03 Jul 2025 20:16 )  PTT:32.3 sec          CAPILLARY BLOOD GLUCOSE  POCT Blood Glucose.: 113 mg/dL (03 Jul 2025 19:54)      OTHER LABS:          IMAGING:  < from: CT Brain Stroke Protocol (07.03.25 @ 20:16) >  IMPRESSION:    1.  Age-indeterminate left frontal infarct, consider MRI.  2.  No acute intracranial hemorrhage.    < end of copied text >      < from: CT Angio Brain Stroke Protocol  w/ IV Cont (07.03.25 @ 20:16) >  IMPRESSION:    1.  Left P3 PCA clot.  2.  Right ICA origin stenosis, 50-60%.  3.  Left ICA origin stenosis, 40-50%.  4.  Moderate to severe left and moderate right vertebral origin stenoses.    < end of copied text >    < from: CT Brain Perfusion Maps Stroke (07.03.25 @ 20:17) >  IMPRESSION:    1.  Tmax abnormality in the inferior frontal lobes.  2.  No perfusion abnormalities in the left middle frontal gyrus.  3.  Consider MRI to exclude acute ischemia.    < end of copied text >  EKG: HPI:  74M w/ hx of Rheumatoid arthritis and resultant peripheral neuropathy, CAD s/p stent 3 weeks prior, and HLD presenting as code stroke for vision loss. Patient reports waking up fine at 8AM by 9AM he was unable to see from his left eye which was not improving. Right eye unaffected. Otherwise patient denies any other weakness, sensory changes, discoordination. Of note the patient had a cardiac stent placed 3 weeks ago and has been taking Aspirin + Plavix. Reports that he has a known history of prior chronic infarct that was discovered ~6 months prior incidentally which began his current extensive cardiac workup.    PAST MEDICAL & SURGICAL HISTORY:  Dyslipidemia  History of BPH  H/O vitamin D deficiency  Arthritis, rheumatoid  Hypertension  History of frostbite  Olecranon bursitis, left elbow  History of bilateral knee replacement  S/P shoulder replacement, right      Home Medications:  aspirin 81 mg oral tablet: 1 tab(s) orally once a day PM (23 Jan 2025 07:52)  D3 5,000+ K 1 cap PO daily PM:  (23 Jan 2025 07:52)  doxycycline hyclate 100 mg oral capsule: 1 cap(s) orally 2 times a day (23 Jan 2025 07:52)  DULoxetine 60 mg oral delayed release capsule: 2 cap(s) orally once a day AM (23 Jan 2025 07:52)  dutasteride 0.5 mg oral capsule: 1 cap(s) orally once a day PM (23 Jan 2025 07:52)  lisinopril-hydrochlorothiazide 20 mg-25 mg oral tablet: 1 tab(s) orally once a day AM (23 Jan 2025 07:52)  methylPREDNISolone 4 mg oral tablet: 3 tab(s) orally once a day AM (23 Jan 2025 07:52)  Norvasc 10 mg oral tablet: 1 tab(s) orally once a day AM (23 Jan 2025 07:52)  pregabalin 200 mg oral capsule: 1 cap(s) orally 2 times a day (23 Jan 2025 07:52)  rosuvastatin 10 mg oral capsule: 1 cap(s) orally once a day pm (23 Jan 2025 07:52)  tamsulosin 0.4 mg oral capsule: 2 cap(s) orally once a day 30 minutes after dinner (23 Jan 2025 07:52)  vitamin B complex GC  1 CAP PO daily PM:  (23 Jan 2025 07:52)      Allergies  No Known Allergies  Intolerances        FAMILY HISTORY:      Social History:      REVIEW OF SYSTEMS:  As per HPI    CURRENT MEDICATIONS:       VITAL SIGNS, INS/OUTS (last 24 hours):  Vital Signs Last 24 Hrs  T(C): 36.6 (03 Jul 2025 19:47), Max: 36.6 (03 Jul 2025 19:47)  T(F): 97.9 (03 Jul 2025 19:47), Max: 97.9 (03 Jul 2025 19:47)  HR: 89 (03 Jul 2025 19:47) (89 - 89)  BP: 109/70 (03 Jul 2025 19:47) (109/70 - 109/70)  BP(mean): --  RR: 20 (03 Jul 2025 19:47) (20 - 20)  SpO2: 96% (03 Jul 2025 19:47) (96% - 96%)    Parameters below as of 03 Jul 2025 19:47  Patient On (Oxygen Delivery Method): room air      I&O's Summary      BASIC LABS:                          13.8   5.69  )-----------( 211      ( 03 Jul 2025 20:16 )             42.4   07-03    141  |  107  |  8   ----------------------------<  110[H]  3.5   |  22  |  0.82    Ca    9.3      03 Jul 2025 20:16    TPro  6.6  /  Alb  3.8  /  TBili  0.5  /  DBili  x   /  AST  18  /  ALT  19  /  AlkPhos  81  07-03  PT/INR - ( 03 Jul 2025 20:16 )   PT: 11.9 sec;   INR: 1.04 ratio         PTT - ( 03 Jul 2025 20:16 )  PTT:32.3 sec          CAPILLARY BLOOD GLUCOSE  POCT Blood Glucose.: 113 mg/dL (03 Jul 2025 19:54)      OTHER LABS:          IMAGING:  < from: CT Brain Stroke Protocol (07.03.25 @ 20:16) >  IMPRESSION:    1.  Age-indeterminate left frontal infarct, consider MRI.  2.  No acute intracranial hemorrhage.    < end of copied text >      < from: CT Angio Brain Stroke Protocol  w/ IV Cont (07.03.25 @ 20:16) >  IMPRESSION:    1.  Left P3 PCA clot.  2.  Right ICA origin stenosis, 50-60%.  3.  Left ICA origin stenosis, 40-50%.  4.  Moderate to severe left and moderate right vertebral origin stenoses.    < end of copied text >    < from: CT Brain Perfusion Maps Stroke (07.03.25 @ 20:17) >  IMPRESSION:    1.  Tmax abnormality in the inferior frontal lobes.  2.  No perfusion abnormalities in the left middle frontal gyrus.  3.  Consider MRI to exclude acute ischemia.    < end of copied text >  EKG:

## 2025-07-03 NOTE — H&P ADULT - NSHPPHYSICALEXAM_GEN_ALL_CORE
PHYSICAL EXAM:  Gen: sitting in wheelchair, no acute distress, answering questions appropriately  HEENT: NCAT, MMM,  Neck: supple, trachea at midline  Pulm: adequate respiratory effort, no increase in work of breathing  Abd: soft, NTND  Skin: warm and dry, no new rashes vs prior report  Ext: WWP, no LE edema  Neuro: AOx3, no gross focal neurological deficits, left monocular vision loss with some preserved superior temporal visual field intact, no APD, finger-to-nose and heel-to-shin intact, naming intact, +left APD, Reflexes, Joint position sense lost in bilateral LE, Vibration sensation impaired below the knees bilaterally

## 2025-07-03 NOTE — H&P ADULT - ASSESSMENT
Assessment:    74M w/ hx of Rheumatoid arthritis and resultant peripheral neuropathy, CAD s/p stent 3 weeks prior (on DAPT), and HLD presenting for acute, near complete left monocular vision loss.    LKW: 7/3/25 @08:00  NIHSS: 3 (near complete left monocular vision loss)   Baseline mRS: 1  Not a Tenecteplase candidate due to out of time window  Not a thrombectomy candidate due to no LVO    Impression:      Rapid onset L near complete monocular vision loss concerning for BRAO, discussed with ophthalmology     Plan/Recommendations:    [x] admit to stroke unit  [x] Ophthalmology consulted  [] MRI brain without contrast  [] MRA brain without contrast; MRA neck with contrast; MR NOVA  [] TTE (***with bubble for patients aged 60 and under)   [] Consider Implantable loop recorder   [] Other studies to consider: CT Chest/Abdomen/Pelvis with contrast, Carotid dopplers, Bilateral lower extremity venous dopplers, Hypercoagulable workup  [] Send HbA1c, Lipid panel  [] Antithrombotic therapy or anticoagulation: Start DAPT, can load with Aspirin 325mg or Plavix 300mg. Continue Aspirin 81mg and Plavix 75mg for 3 weeks followed by aspirin 81mg monotherapy (as per CHANCE and POINT trials). OR c/w full dose anticoagulation (i.e if hx of afib and requires anticoagulation).  [] Atorvastatin 80mg (titrate to goal LDL < 70 or per 10 year ASCVD risk based on mechanism)    [] Keep BP permissive up to 220/110 for 48 hours from symptom onset followed by gradual normotension to 120/80  [] Monitor on Telemetry   [] Stroke Neurological checks and vital signs q2/ q4 hours   [] Inpatient blood glucose goal 140-180. Long-term goal HgbA1c < 6%  [] NPO pending dysphagia screening. SLP/S&S eval if fails   [] PT/OT eval  [] Patient advised as to lifestyle modifications including smoking cessation.       DVT prophylaxis: [] SCDs + [] chemical ppx   Diet:   Dispo:        CORE MEASURES:       Initial NIHSS: ***   Tenecteplase:  [] YES  mm/dd/yy at hh:mm .   [] NO, not administered due to:    Afib: [] YES   [] NO    AC/AP: [] YES   [] NO    LDL: ***   HDL: ***   Statin Therapy: [] YES   [] NO     Smoking history:         [] Current smoker. Patient counselled as to smoking cessation.         [] Former smoker: *** ppd.         [] Never smoker   Dysphagia Screen: [] PASS [] FAIL  [] Pending   Depression screen: [] YES   [] NO   Stroke education: [] YES   [] NO       Patient discussed with Stroke fellow  *** under supervision of Stroke attending  **** regarding decision against candidacy for Tenecteplase / thrombectomy.      Assessment:    74M w/ hx of Rheumatoid arthritis and resultant peripheral neuropathy, CAD s/p stent 3 weeks prior (on DAPT), and HLD presenting for acute, near complete left monocular vision loss.    LKW: 7/3/25 @08:00  NIHSS: 3 (near complete left monocular vision loss)   Baseline mRS: 1  Not a Tenecteplase candidate due to out of time window  Not a thrombectomy candidate due to no LVO    Impression:      Rapid onset L near complete monocular vision loss concerning for BRAO, discussed with ophthalmology     Plan/Recommendations:    [x] admit to stroke unit  [x] Ophthalmology consulted  [] MRI brain without contrast  [] TTE without bubble  [] Consider Implantable loop recorder   [] Send HbA1c, Lipid panel  [] Antithrombotic therapy or anticoagulation: Start DAPT, can load with Aspirin 325mg or Plavix 300mg. Continue Aspirin 81mg and Plavix 75mg for 3 weeks followed by aspirin 81mg monotherapy (as per CHANCE and POINT trials). OR c/w full dose anticoagulation (i.e if hx of afib and requires anticoagulation).  [] Atorvastatin 80mg (titrate to goal LDL < 70 or per 10 year ASCVD risk based on mechanism)    [] Keep BP permissive up to 220/110 for 48 hours from symptom onset followed by gradual normotension to 120/80  [] Monitor on Telemetry   [] Stroke Neurological checks and vital signs q4 hours   [] Long-term goal HgbA1c < 6%  [] NPO pending dysphagia screening. SLP/S&S eval if fails   [] PT/OT eval  [] advise lifestyle modifications       DVT prophylaxis: [] SCDs + [X] chemical ppx   Diet: Regular pending dysphagia screen       CORE MEASURES:       Initial NIHSS: 3  Tenecteplase:  [] YES  mm/dd/yy at hh:mm .   [x] NO, not administered due to:  out of window  Afib: [] YES   [] NO    AC/AP: [] YES   [] NO    LDL: ***   HDL: ***   Statin Therapy: [x] YES   [] NO     Smoking history:         [] Current smoker. Patient counselled as to smoking cessation.         [x] Former smoker: quit 20 years prior       [] Never smoker   Dysphagia Screen: [] PASS [] FAIL  [x] Pending   Depression screen: [] YES   [] NO   Stroke education: [] YES   [] NO       Patient discussed with Stroke fellow Dr. Florentino Hopper under supervision of Stroke attending Dr. Sauceda regarding decision against candidacy for Tenecteplase / thrombectomy.

## 2025-07-03 NOTE — ED ADULT NURSE NOTE - NSFALLRISKINTERV_ED_ALL_ED

## 2025-07-03 NOTE — ED ADULT NURSE NOTE - OBJECTIVE STATEMENT
Pt presents to ED from home c/o L eye vision loss since 0930 AM, pt endorses he woke up at 0800 AM and was fine and around 0930 pt went to scratch his eye cause "he felt he had something in his eye", and felt "complete loss of vision". No redness or drainage noted. Pt also endorses headache for last 2 days. Pt denies chest pain, chest palpitations, SOB, n/v/d, fever/chills, numbness/tingling to extremities. Pt also endorses chronic gait instability d/t neuropathy from nerve damage. Plan of care and monitoring discussed with pt. Bed locked and lowered for safety. Safety and comfort maintained. Pt presents to ED from home c/o L eye vision loss since 0930 AM, pt endorses he woke up at 0800 AM and was fine and around 0930 pt went to scratch his eye cause "he felt he had something in his eye", and felt "complete loss of vision". No redness or drainage noted. Pt also endorses headache for last 2 days. PMH of RA, CAD w/ stent placed 3 weeks ago. Pt denies chest pain, chest palpitations, SOB, n/v/d, fever/chills, numbness/tingling to extremities. Pt also endorses chronic gait instability d/t neuropathy from nerve damage. Plan of care and monitoring discussed with pt. Bed locked and lowered for safety. Safety and comfort maintained.

## 2025-07-03 NOTE — ED PROVIDER NOTE - OBJECTIVE STATEMENT
74-year-old male with a history of HTN, HLD, CAD with 1 stent on aspirin prophylaxis (3 weeks prior) presents for evaluation of 1 day of left eye vision loss.  Patient woke up and states last known normal was 8:30 AM, 9:30 AM patient states his left eye went completely black.  Denies numbness, weakness, headache, dysarthria, fever, chills, chest pain, shortness of breath, hematuria, dysuria, or any other symptoms at this time. Najma Sands DO (PGY-3)

## 2025-07-03 NOTE — CONSULT NOTE ADULT - ASSESSMENT
Assessment:    74M w/ hx of Rheumatoid arthritis and resultant peripheral neuropathy, CAD s/p stent 3 weeks prior (on DAPT), and HLD presenting for acute complete left monocular vision loss.    LKW: 7/3/25 @08:00  NIHSS: 3 (complete left monocular vision loss)   Baseline mRS: 1  Not a Tenecteplase candidate due to out of time window  Not a thrombectomy candidate due to******    Impression:      Rapid onset L complete monocular vision loss concerning for CRAO. Breanna    Plan/Recommendations:    [] Inpatient admission for stroke workup  [] Ophthalmology consultation  [] MRI brain without contrast  [] MRA brain without contrast; MRA neck with contrast; MR NOVA  [] TTE (***with bubble for patients aged 60 and under)   [] Consider Implantable loop recorder   [] Other studies to consider: CT Chest/Abdomen/Pelvis with contrast, Carotid dopplers, Bilateral lower extremity venous dopplers, Hypercoagulable workup  [] Send HbA1c, Lipid panel  [] Antithrombotic therapy or anticoagulation: Start DAPT, can load with Aspirin 325mg or Plavix 300mg. Continue Aspirin 81mg and Plavix 75mg for 3 weeks followed by aspirin 81mg monotherapy (as per CHANCE and POINT trials). OR c/w full dose anticoagulation (i.e if hx of afib and requires anticoagulation).  [] Atorvastatin 80mg (titrate to goal LDL < 70 or per 10 year ASCVD risk based on mechanism)    [] Keep BP permissive up to 220/110 for 48 hours from symptom onset followed by gradual normotension to 120/80  [] Monitor on Telemetry   [] Stroke Neurological checks and vital signs q2/ q4 hours   [] Inpatient blood glucose goal 140-180. Long-term goal HgbA1c < 6%  [] NPO pending dysphagia screening. SLP/S&S eval if fails   [] PT/OT eval  [] Patient advised as to lifestyle modifications including smoking cessation.       DVT prophylaxis: [] SCDs + [] chemical ppx   Diet:   Dispo:        CORE MEASURES:       Initial NIHSS: ***   Tenecteplase:  [] YES  mm/dd/yy at hh:mm .   [] NO, not administered due to:    Afib: [] YES   [] NO    AC/AP: [] YES   [] NO    LDL: ***   HDL: ***   Statin Therapy: [] YES   [] NO     Smoking history:         [] Current smoker. Patient counselled as to smoking cessation.         [] Former smoker: *** ppd.         [] Never smoker   Dysphagia Screen: [] PASS [] FAIL  [] Pending   Depression screen: [] YES   [] NO   Stroke education: [] YES   [] NO       Patient discussed with Stroke fellow  *** under supervision of Stroke attending  **** regarding decision against candidacy for Tenecteplase / thrombectomy.

## 2025-07-03 NOTE — CONSULT NOTE ADULT - ASSESSMENT
Assessment and Recommendations:  74y male with a past medical history/ocular history as above consulted for rule out CRAO     #Central Retinal Artery Occlusion OS  - VA 20/40 OD, VA LP OS with CF temporally, last known normal around 8:30AM  - Surgical pupil that responds to light OD, +APD OS,    - IOP WNL, Color plates 11/13 OD, SOURAV OS, CVF full OD, eccentrically constricted OS  - Pending ESR/CRP, rest of GCA ROS negative (no headaches, no scalp tenderness, pulses full, with no jaw claudication)  - BP normal, Pending additional labs  - Ant exam with iris atrophy OU and surgical pupil OD  - DFE with retinal whitening OS within the peripapillary bundle with cherry red macula   - NeuroIR called - no acute intervention at this time given risk of TPA outweighs benefit at this window of time   - Neurology Recommendations appreciated  - Recommend full stroke work up  - Consider cartoid duplex ultrasound  - Consider MRI/MRA  - Consider echo study    Seen and discussed with:   Dr. Allison, Chief Resident      Discussed with:  Dr. Ortiz Retina Attending  Dr. Kumar, Retina Attending    Outpatient Follow-up: Patient should follow-up with his/her ophthalmologist or with [facility] Department of Ophthalmology within 1 week of after discharge at:    [address]  [place] [address]  [phone]    Grupo Mahajan MD, PGY-3  Contact: Microsoft Teams     Assessment and Recommendations:  74y male with a past medical history/ocular history as above consulted for rule out CRAO     #Central Retinal Artery Occlusion OS  • Visual acuity: 20/40 OD, LP OS with CF temporally  • Relative afferent pupillary defect (RAPD): Present OS  • IOP: WNL OU  • Color vision: 11/13 OD, SOURAV OS  • Confrontation fields: Full OD, eccentrically constricted OS  • Pupils: Surgical pupil OD with light response; +APD OS  • Anterior exam: Iris atrophy OU, surgical pupil OD  • Fundus exam OS: Peripapillary retinal whitening with a cherry red spot, consistent with CRAO  • Fundus OD: Normal  • ESR/CRP pending  • ROS negative: No headache, scalp tenderness, jaw claudication, fatigue, or weight loss  • Temporal pulses full, BP normal  • No systemic signs of GCA at this time  • Will continue to monitor labs        - Last known normal around 8:30AM  - Surgical pupil that responds to light OD, +APD OS,    - IOP WNL, Color plates 11/13 OD, SOURAV OS, CVF full OD, eccentrically constricted OS  - Pending ESR/CRP, rest of GCA ROS negative (no headaches, no scalp tenderness, pulses full, with no jaw claudication)  - BP normal, Pending additional labs  - Ant exam with iris atrophy OU and surgical pupil OD  - DFE with retinal whitening OS within the posterior pole with cherry red macula   - NeuroIR and neurology consulted - no acute intervention at this time given risk of TPA outweighs benefit at this window of time   - Neurology Recommendations appreciated  - Recommend full stroke work up  - Recommend cartoid duplex ultrasound and Echocardiographic studies   - Recommend MRI/MRA   # Central Retinal Artery Occlusion (OS)  • Visual acuity: 20/40 OD, LP OS with CF temporally  • Relative afferent pupillary defect (RAPD): Present OS  • IOP: WNL OU  • Color vision: 11/13 OD, SOURAV OS  • Confrontation fields: Full OD, eccentrically constricted OS  • Pupils: Surgical pupil OD with light response; sluggish, nonreactive OS  •Anterior exam: Iris atrophy OU, surgical pupil OD  • Fundus exam OS: Peripapillary retinal whitening with a cherry red spot, consistent with CRAO  • ESR/CRP pending  • ROS negative: No headache, scalp tenderness, jaw claudication, fatigue, or weight loss  • Pulses full, BP normal  • No systemic signs of GCA at this time  • Will continue to monitor labs; treat as non-arteritic unless results suggest otherwised      Seen and discussed with:   Dr. Allison, Chief Resident      Discussed with:  Dr. Ortiz Retina Attending  Dr. Kumar, Retina Attending    Outpatient Follow-up: Patient should follow-up with his/her ophthalmologist or with [facility] Department of Ophthalmology within 1 week of after discharge at:    [address]  [place] [address]  [phone]    Grupo Mahajan MD, PGY-3  Contact: Microsoft Teams     Assessment and Recommendations:  74y male with a past medical history/ocular history as above consulted for rule out CRAO     #Central Retinal Artery Occlusion OS  - Last known normal 8-9AM, told to come to ED urgently by outpt provider Dr. Ortiz  - 20/40 OD, LP OS with CF temporally  - +APD OS and IOP WNL OU  - Color vision: 11/13 OD, SOURAV OS Confrontation fields: Full OD, eccentrically constricted OS  - GCA ROS negative: No headache, scalp tenderness, jaw claudication, fatigue, or weight loss, Temporal pulses full, BP normal - No systemic signs of GCA at this time  - Fundus exam Normal OD: Peripapillary retinal whitening with a cherry red spot, consistent with CRAO OS  - Neurology and NeuroIR consulted - no acute intervention at this time  - Neurology Recommendations appreciated   - Recommend full stroke work up  - Recommend cartoid duplex ultrasound and Echocardiographic studies   - Recommend MRI/MRA  - ESR/CRP pending  - Will continue to monitor labs    Seen and discussed with:   Dr. Allison, Chief Resident    Discussed with:  Dr. Ortiz Retina Attending  Dr. Kumar, Retina Attending    Outpatient Follow-up: Patient should follow-up with his/her ophthalmologist or with [facility] Department of Ophthalmology within 1 week of after discharge at:    [address]  [place] [address]  [phone]    Grupo Mahajan MD, PGY-3  Contact: Microsoft Teams     Assessment and Recommendations:  74y male with a past medical history/ocular history as above consulted for rule out CRAO     #Central Retinal Artery Occlusion OS  - Last known normal 8-9AM, told to come to ED urgently by outpt provider Dr. Ortiz  - 20/40 OD, LP OS with CF temporally  - +APD OS and IOP WNL OU  - Color vision: 11/13 OD, SOURAV OS Confrontation fields: Full OD, eccentrically constricted OS  - GCA ROS negative: No headache, scalp tenderness, jaw claudication, fatigue, or weight loss, Temporal pulses full, BP normal - No systemic signs of GCA at this time  - Fundus exam Normal OD. Peripapillary retinal whitening with a cherry red spot, consistent with CRAO OS  - Neurology and NeuroIR consulted - no acute intervention at this time  - Neurology Recommendations appreciated   - Recommend full stroke work up  - Recommend cartoid duplex ultrasound and Echocardiographic studies   - Recommend MRI/MRA  - ESR/CRP pending  - Will continue to monitor labs    Seen and discussed with:   Dr. Allison, Chief Resident    Discussed with:  Dr. Ortiz Retina Attending  Dr. Kumar, Retina Attending    Outpatient Follow-up: Patient should follow-up with his/her ophthalmologist or with  Department of Ophthalmology within 1 week of after discharge at:    [address]  [place] [address]  [phone]    Grupo Mahajan MD, PGY-3  Contact: Microsoft Teams     Assessment and Recommendations:  74y male with a past medical history/ocular history as above consulted for rule out CRAO     #Central Retinal Artery Occlusion OS  - Last known normal 8-9AM, told to come to ED urgently by outpt provider Dr. Ortiz  - 20/40 OD, LP OS with CF temporally  - +APD OS and IOP WNL OU  - Color vision: 11/13 OD, SOURAV OS Confrontation fields: Full OD, eccentrically constricted temporally OS  - GCA ROS negative: No headache, scalp tenderness, jaw claudication, fatigue, or weight loss, Temporal pulses full, BP normal - No systemic signs of GCA at this time  - Fundus exam Normal OD. Retinal whitening at posterior pole with a cherry red spot, consistent with CRAO OS  - Neurology and NeuroIR consulted - no acute intervention at this time  - Neurology Recommendations appreciated   - Recommend full stroke work up  - Recommend cartoid duplex ultrasound and Echocardiographic studies   - Recommend MRI/MRA  - ESR/CRP pending  - Will continue to monitor labs    Seen and discussed with:   Dr. Allison, Chief Resident    Discussed with:  Dr. Ortiz Retina Attending  Dr. Kumar, Retina Attending    Outpatient Follow-up: Patient should follow-up with his/her ophthalmologist or with [facility] Department of Ophthalmology within 1 week of after discharge at:    [address]  [place] [address]  [phone]    Grupo Mahajan MD, PGY-3  Contact: [employer] Teams

## 2025-07-03 NOTE — ED PROVIDER NOTE - PHYSICAL EXAMINATION
Najma Sands,  (PGY3)   Physical Exam:    Gen: NAD, AOx3, non-toxic appearing, able to ambulate without assistance  Head: NCAT  HEENT: EOMI, PEERLA, normal conjunctiva, tongue midline, oral mucosa moist. L eye with no vision, R eye 20/20  Lung: CTAB, no respiratory distress, no wheezes/rhonchi/rales B/L  CV: RRR, no murmurs, rubs or gallops  Abd: soft, NT, ND, no guarding, no rigidity, no rebound tenderness, no CVA tenderness   MSK: no visible deformities, ROM normal in UE/LE, no back pain  Neuro: A&Ox4, MS +5/5 in UE and LE BL, finger to nose smooth and rapid, gross sensation intact in UE and LE BL, gait smooth and coordinated, negative rhomberg, negative pronator drift

## 2025-07-03 NOTE — H&P ADULT - NSHPSOCIALHISTORY_GEN_ALL_CORE
Prior heavy EtOH use, quit 18 months prior. Prior smoker quit  20 years prior. Uses a cane to ambulate. former

## 2025-07-04 ENCOUNTER — TRANSCRIPTION ENCOUNTER (OUTPATIENT)
Age: 75
End: 2025-07-04

## 2025-07-04 VITALS
SYSTOLIC BLOOD PRESSURE: 138 MMHG | OXYGEN SATURATION: 97 % | RESPIRATION RATE: 18 BRPM | DIASTOLIC BLOOD PRESSURE: 71 MMHG | HEART RATE: 75 BPM

## 2025-07-04 LAB
ANION GAP SERPL CALC-SCNC: 15 MMOL/L — SIGNIFICANT CHANGE UP (ref 5–17)
APTT BLD: 32.4 SEC — SIGNIFICANT CHANGE UP (ref 26.1–36.8)
BASOPHILS # BLD AUTO: 0.08 K/UL — SIGNIFICANT CHANGE UP (ref 0–0.2)
BASOPHILS NFR BLD AUTO: 1.5 % — SIGNIFICANT CHANGE UP (ref 0–2)
BUN SERPL-MCNC: 7 MG/DL — SIGNIFICANT CHANGE UP (ref 7–23)
CALCIUM SERPL-MCNC: 9.2 MG/DL — SIGNIFICANT CHANGE UP (ref 8.4–10.5)
CHLORIDE SERPL-SCNC: 108 MMOL/L — SIGNIFICANT CHANGE UP (ref 96–108)
CO2 SERPL-SCNC: 20 MMOL/L — LOW (ref 22–31)
CREAT SERPL-MCNC: 0.78 MG/DL — SIGNIFICANT CHANGE UP (ref 0.5–1.3)
EGFR: 94 ML/MIN/1.73M2 — SIGNIFICANT CHANGE UP
EGFR: 94 ML/MIN/1.73M2 — SIGNIFICANT CHANGE UP
EOSINOPHIL # BLD AUTO: 0.32 K/UL — SIGNIFICANT CHANGE UP (ref 0–0.5)
EOSINOPHIL NFR BLD AUTO: 6.1 % — HIGH (ref 0–6)
GLUCOSE SERPL-MCNC: 96 MG/DL — SIGNIFICANT CHANGE UP (ref 70–99)
HCT VFR BLD CALC: 40.6 % — SIGNIFICANT CHANGE UP (ref 39–50)
HGB BLD-MCNC: 13.1 G/DL — SIGNIFICANT CHANGE UP (ref 13–17)
IMM GRANULOCYTES # BLD AUTO: 0.02 K/UL — SIGNIFICANT CHANGE UP (ref 0–0.07)
IMM GRANULOCYTES NFR BLD AUTO: 0.4 % — SIGNIFICANT CHANGE UP (ref 0–0.9)
INR BLD: 1.02 RATIO — SIGNIFICANT CHANGE UP (ref 0.85–1.16)
LYMPHOCYTES # BLD AUTO: 1.47 K/UL — SIGNIFICANT CHANGE UP (ref 1–3.3)
LYMPHOCYTES NFR BLD AUTO: 28.2 % — SIGNIFICANT CHANGE UP (ref 13–44)
MCHC RBC-ENTMCNC: 29.8 PG — SIGNIFICANT CHANGE UP (ref 27–34)
MCHC RBC-ENTMCNC: 32.3 G/DL — SIGNIFICANT CHANGE UP (ref 32–36)
MCV RBC AUTO: 92.3 FL — SIGNIFICANT CHANGE UP (ref 80–100)
MONOCYTES # BLD AUTO: 0.63 K/UL — SIGNIFICANT CHANGE UP (ref 0–0.9)
MONOCYTES NFR BLD AUTO: 12.1 % — SIGNIFICANT CHANGE UP (ref 2–14)
NEUTROPHILS # BLD AUTO: 2.69 K/UL — SIGNIFICANT CHANGE UP (ref 1.8–7.4)
NEUTROPHILS NFR BLD AUTO: 51.7 % — SIGNIFICANT CHANGE UP (ref 43–77)
NRBC # BLD AUTO: 0 K/UL — SIGNIFICANT CHANGE UP (ref 0–0)
NRBC # FLD: 0 K/UL — SIGNIFICANT CHANGE UP (ref 0–0)
NRBC BLD AUTO-RTO: 0 /100 WBCS — SIGNIFICANT CHANGE UP (ref 0–0)
PLATELET # BLD AUTO: 185 K/UL — SIGNIFICANT CHANGE UP (ref 150–400)
PMV BLD: 10.7 FL — SIGNIFICANT CHANGE UP (ref 7–13)
POTASSIUM SERPL-MCNC: 3.5 MMOL/L — SIGNIFICANT CHANGE UP (ref 3.5–5.3)
POTASSIUM SERPL-SCNC: 3.5 MMOL/L — SIGNIFICANT CHANGE UP (ref 3.5–5.3)
PROTHROM AB SERPL-ACNC: 11.7 SEC — SIGNIFICANT CHANGE UP (ref 9.9–13.4)
RBC # BLD: 4.4 M/UL — SIGNIFICANT CHANGE UP (ref 4.2–5.8)
RBC # FLD: 12.7 % — SIGNIFICANT CHANGE UP (ref 10.3–14.5)
SODIUM SERPL-SCNC: 143 MMOL/L — SIGNIFICANT CHANGE UP (ref 135–145)
WBC # BLD: 5.21 K/UL — SIGNIFICANT CHANGE UP (ref 3.8–10.5)
WBC # FLD AUTO: 5.21 K/UL — SIGNIFICANT CHANGE UP (ref 3.8–10.5)

## 2025-07-04 PROCEDURE — 70540 MRI ORBIT/FACE/NECK W/O DYE: CPT | Mod: 26

## 2025-07-04 PROCEDURE — 97161 PT EVAL LOW COMPLEX 20 MIN: CPT

## 2025-07-04 PROCEDURE — 83605 ASSAY OF LACTIC ACID: CPT

## 2025-07-04 PROCEDURE — 80048 BASIC METABOLIC PNL TOTAL CA: CPT

## 2025-07-04 PROCEDURE — 85610 PROTHROMBIN TIME: CPT

## 2025-07-04 PROCEDURE — 70551 MRI BRAIN STEM W/O DYE: CPT | Mod: 26

## 2025-07-04 PROCEDURE — 82803 BLOOD GASES ANY COMBINATION: CPT

## 2025-07-04 PROCEDURE — 85025 COMPLETE CBC W/AUTO DIFF WBC: CPT

## 2025-07-04 PROCEDURE — 85730 THROMBOPLASTIN TIME PARTIAL: CPT

## 2025-07-04 PROCEDURE — 0042T: CPT

## 2025-07-04 PROCEDURE — 83036 HEMOGLOBIN GLYCOSYLATED A1C: CPT

## 2025-07-04 PROCEDURE — 85018 HEMOGLOBIN: CPT

## 2025-07-04 PROCEDURE — 70498 CT ANGIOGRAPHY NECK: CPT

## 2025-07-04 PROCEDURE — 86140 C-REACTIVE PROTEIN: CPT

## 2025-07-04 PROCEDURE — 84484 ASSAY OF TROPONIN QUANT: CPT

## 2025-07-04 PROCEDURE — 84132 ASSAY OF SERUM POTASSIUM: CPT

## 2025-07-04 PROCEDURE — 70551 MRI BRAIN STEM W/O DYE: CPT

## 2025-07-04 PROCEDURE — 84295 ASSAY OF SERUM SODIUM: CPT

## 2025-07-04 PROCEDURE — 80053 COMPREHEN METABOLIC PANEL: CPT

## 2025-07-04 PROCEDURE — 82962 GLUCOSE BLOOD TEST: CPT

## 2025-07-04 PROCEDURE — 82330 ASSAY OF CALCIUM: CPT

## 2025-07-04 PROCEDURE — 97166 OT EVAL MOD COMPLEX 45 MIN: CPT

## 2025-07-04 PROCEDURE — 70540 MRI ORBIT/FACE/NECK W/O DYE: CPT

## 2025-07-04 PROCEDURE — 85014 HEMATOCRIT: CPT

## 2025-07-04 PROCEDURE — 82435 ASSAY OF BLOOD CHLORIDE: CPT

## 2025-07-04 PROCEDURE — 82947 ASSAY GLUCOSE BLOOD QUANT: CPT

## 2025-07-04 PROCEDURE — 70450 CT HEAD/BRAIN W/O DYE: CPT | Mod: XU

## 2025-07-04 PROCEDURE — 85652 RBC SED RATE AUTOMATED: CPT

## 2025-07-04 PROCEDURE — 80061 LIPID PANEL: CPT

## 2025-07-04 PROCEDURE — 70496 CT ANGIOGRAPHY HEAD: CPT

## 2025-07-04 PROCEDURE — 99285 EMERGENCY DEPT VISIT HI MDM: CPT | Mod: 25

## 2025-07-04 PROCEDURE — 99223 1ST HOSP IP/OBS HIGH 75: CPT | Mod: FS

## 2025-07-04 RX ORDER — CLOPIDOGREL BISULFATE 75 MG/1
75 TABLET, FILM COATED ORAL DAILY
Refills: 0 | Status: DISCONTINUED | OUTPATIENT
Start: 2025-07-04 | End: 2025-07-04

## 2025-07-04 RX ADMIN — PREGABALIN 200 MILLIGRAM(S): 50 CAPSULE ORAL at 18:50

## 2025-07-04 RX ADMIN — DULOXETINE 120 MILLIGRAM(S): 20 CAPSULE, DELAYED RELEASE ORAL at 08:43

## 2025-07-04 RX ADMIN — PREGABALIN 200 MILLIGRAM(S): 50 CAPSULE ORAL at 05:06

## 2025-07-04 RX ADMIN — CLOPIDOGREL BISULFATE 75 MILLIGRAM(S): 75 TABLET, FILM COATED ORAL at 11:27

## 2025-07-04 RX ADMIN — ENOXAPARIN SODIUM 40 MILLIGRAM(S): 100 INJECTION SUBCUTANEOUS at 05:06

## 2025-07-04 RX ADMIN — Medication 81 MILLIGRAM(S): at 11:27

## 2025-07-04 NOTE — OCCUPATIONAL THERAPY INITIAL EVALUATION ADULT - PERTINENT HX OF CURRENT PROBLEM, REHAB EVAL
PMHx: Rheumatoid arthritis and resultant peripheral neuropathy, CAD s/p stent 3wks ago, HLD. presenting as code stroke for acute, near complete L monocular vision loss. Pt reports waking up fine at 8AM (LKW) by 9AM he was unable to see from his L eye which was not improving. R eye unaffected. Otherwise patient denies any other weakness, sensory changes, discoordination. Of note the patient had a cardiac stent placed 3 weeks ago and has been taking Aspirin + Plavix. Reports that he has a known history of prior chronic infarct that was discovered ~6 months prior incidentally which began his current extensive cardiac workup. NIHSS 3. Not a Tenecteplase candidate due to out of time window, Not a thrombectomy candidate due to no LVO. opthomology consult: Retinal whitening at posterior pole with a cherry red spot, consistent with CRAO OS    CTA: Left P3 PCA clot. Right ICA origin stenosis, 50-60%. Left ICA origin stenosis, 40-50%. Moderate to severe left and moderate right vertebral origin stenoses.  CTH: Age-indeterminate left frontal infarct, consider MRI. No acute intracranial hemorrhage  CTP: Tmax abnormality in the inferior frontal lobes. No perfusion abnormalities in the left middle frontal gyrus.

## 2025-07-04 NOTE — CONSULT NOTE ADULT - SUBJECTIVE AND OBJECTIVE BOX
p (1480)     74M on ASA/plavix CAD s/p stent (3 wks ago), HLD, RA pw acute L eye loss of vision. HA for 1 day prior to vision loss. No weakness, sensory changes, or eye pain. No N/V.          =====================  PAST MEDICAL HISTORY   Dyslipidemia    History of BPH    H/O vitamin D deficiency    Arthritis, rheumatoid    Hypertension    History of frostbite    Olecranon bursitis, left elbow      PAST SURGICAL HISTORY   History of bilateral knee replacement    S/P shoulder replacement, right      No Known Allergies      MEDICATIONS:  Antibiotics:    Neuro:  DULoxetine 120 milliGRAM(s) Oral with breakfast  pregabalin 200 milliGRAM(s) Oral two times a day    Other:  atorvastatin 80 milliGRAM(s) Oral at bedtime  tamsulosin 0.8 milliGRAM(s) Oral at bedtime      SOCIAL HISTORY:   Occupation:   Marital Status:     FAMILY HISTORY:      ROS: Negative except per HPI    LABS:  PT/INR - ( 04 Jul 2025 05:58 )   PT: 11.7 sec;   INR: 1.02 ratio         PTT - ( 04 Jul 2025 05:58 )  PTT:32.4 sec                        13.1   5.21  )-----------( 185      ( 04 Jul 2025 05:58 )             40.6     07-04    143  |  108  |  7   ----------------------------<  96  3.5   |  20[L]  |  0.78    Ca    9.2      04 Jul 2025 05:58    TPro  6.6  /  Alb  3.8  /  TBili  0.5  /  DBili  x   /  AST  18  /  ALT  19  /  AlkPhos  81  07-03

## 2025-07-04 NOTE — CONSULT NOTE ADULT - NSCONSULTADDITIONALINFOA_GEN_ALL_CORE
Agree with the above note    74M on DAPT for CAD s/p stent (3 weeks ago), HLD, RA pw acute onset L eye loss of vision. Concerning for CRAO / Amuarosis.     - MRI brain non-con demonstrating multiple acute infracts in the left ICA territory  - carotid dopplers to further assess degree of stenosis given calcified plaque   - Will consider possible revascularization given concern for symptomatic carotid  - SBP goals per vascular/neurology  - medical mgmt per vascular/neurology  - interventional plan pending conversation with vascular neurology  - Will discuss at vascular conference on monday

## 2025-07-04 NOTE — PHYSICAL THERAPY INITIAL EVALUATION ADULT - PERTINENT HX OF CURRENT PROBLEM, REHAB EVAL
PMHx: Rheumatoid arthritis and resultant peripheral neuropathy, CAD s/p stent 3wks ago, HLD. presenting as code stroke for acute, near complete L monocular vision loss. Pt reports waking up fine at 8AM (LKW) by 9AM he was unable to see from his L eye which was not improving. R eye unaffected. Otherwise patient denies any other weakness, sensory changes, discoordination. Of note the patient had a cardiac stent placed 3 weeks ago and has been taking Aspirin + Plavix. Reports that he has a known history of prior chronic infarct that was discovered ~6 months prior incidentally which began his current extensive cardiac workup. NIHSS 3. Not a Tenecteplase candidate due to out of time window,Not a thrombectomy candidate due to no LVO. opthomology consult: Retinal whitening at posterior pole with a cherry red spot, consistent with CRAO OS    CTA: Left P3 PCA clot. Right ICA origin stenosis, 50-60%. Left ICA origin stenosis, 40-50%. Moderate to severe left and moderate right vertebral origin stenoses.  CTH: Age-indeterminate left frontal infarct, consider MRI. No acute intracranial hemorrhage  CTP: Tmax abnormality in the inferior frontal lobes. No perfusion abnormalities in the left middle frontal gyrus.

## 2025-07-04 NOTE — PHYSICAL THERAPY INITIAL EVALUATION ADULT - ADDITIONAL COMMENTS
lives with wife in private house  with 1 step to enter with 1 rail, uses straight cane for ambulation, right hand dominant, pt states he has a rolling walker at home

## 2025-07-04 NOTE — DISCHARGE NOTE PROVIDER - NSDCMRMEDTOKEN_GEN_ALL_CORE_FT
aspirin 81 mg oral tablet: 1 tab(s) orally once a day PM  D3 5,000+ K 1 cap PO daily PM:   DULoxetine 60 mg oral delayed release capsule: 2 cap(s) orally once a day AM  folic acid: 1 milligram(s) orally once a day  Lisinopril: 20 milligram(s) orally once a day  Plavix 75 mg oral tablet: 1 tab(s) orally once a day  pregabalin 200 mg oral capsule: 1 cap(s) orally 2 times a day  rosuvastatin 10 mg oral capsule: 1 cap(s) orally once a day pm  tamsulosin 0.4 mg oral capsule: 2 cap(s) orally once a day 30 minutes after dinner   aspirin 81 mg oral tablet: 1 tab(s) orally once a day PM  DULoxetine 60 mg oral delayed release capsule: 2 cap(s) orally once a day AM  folic acid: 1 milligram(s) orally once a day  Lisinopril: 20 milligram(s) orally once a day  Plavix 75 mg oral tablet: 1 tab(s) orally once a day  pregabalin 200 mg oral capsule: 1 cap(s) orally 2 times a day  rosuvastatin 10 mg oral capsule: 1 cap(s) orally once a day pm  tamsulosin 0.4 mg oral capsule: 2 cap(s) orally once a day 30 minutes after dinner   aspirin 325 mg oral tablet: 1 tab(s) orally once a day  clopidogrel 75 mg oral tablet: 1 tab(s) orally once a day  DULoxetine 60 mg oral delayed release capsule: 2 cap(s) orally once a day AM  lisinopril 20 mg oral tablet: 1 tab(s) orally once a day  pregabalin 200 mg oral capsule: 1 cap(s) orally 2 times a day  rosuvastatin 10 mg oral capsule: 1 cap(s) orally once a day pm  tamsulosin 0.4 mg oral capsule: 2 cap(s) orally once a day (at bedtime)

## 2025-07-04 NOTE — DISCHARGE NOTE PROVIDER - NSDCCPCAREPLAN_GEN_ALL_CORE_FT
PRINCIPAL DISCHARGE DIAGNOSIS  Diagnosis: CRAO (central retinal artery occlusion), left  Assessment and Plan of Treatment: Please follow up with neurologist in 1 week. The office will call you to schedule an appointment, if you do not hear from them please call [phone]. Continue taking medications as prescribed. If you were prescribed a statin for your cholesterol please make sure you have your liver enzymes checked with your primary care physician. Monitor your blood pressure. Reduce fat, cholesterol and salt in your diet. Increase intake of fruits and vegetables. Limit alcohol to minimum and do not smoke. You may be at risk for falling, make changes to your home to help you walk easier. Keep up to date on vaccinations.  If you experience any symptoms of facial drooping, slurred speech, arm or leg weakness, severe headache, vision changes or any worsening symptoms, notify provider immediately and return to ER.

## 2025-07-04 NOTE — PATIENT PROFILE ADULT - FALL HARM RISK - HARM RISK INTERVENTIONS

## 2025-07-04 NOTE — DISCHARGE NOTE PROVIDER - PROVIDER TOKENS
PROVIDER:[TOKEN:[[id number]],FOLLOWUP:[1 week]],PROVIDER:[TOKEN:[[id number]],FOLLOWUP:[1 week]]

## 2025-07-04 NOTE — OCCUPATIONAL THERAPY INITIAL EVALUATION ADULT - ADDITIONAL COMMENTS
Pt lives in a private house w/ spouse, 4 steps to enter 1 flight within +railing. walk in shower +grab bars +bench. Ambulates w/ a cane 2/2 B/L foot neuropath. Completes mobility/ADLs independently.

## 2025-07-04 NOTE — DISCHARGE NOTE PROVIDER - NSDCFUSCHEDAPPT_GEN_ALL_CORE_FT
Shawn Mcginnis Physician [facility]  NEUROSURG [address] [place] [address]  Scheduled Appointment: 08/07/2025

## 2025-07-04 NOTE — DISCHARGE NOTE NURSING/CASE MANAGEMENT/SOCIAL WORK - NSDCFUADDAPPT_GEN_ALL_CORE_FT
APPTS ARE READY TO BE MADE: [X] YES    Best Family or Patient Contact (if needed):    Additional Information about above appointments (if needed):    1: neurology  2: neuro-optho  3:     Other comments or requests:

## 2025-07-04 NOTE — DISCHARGE NOTE PROVIDER - CARE PROVIDERS DIRECT ADDRESSES
[email or web address]

## 2025-07-04 NOTE — DISCHARGE NOTE NURSING/CASE MANAGEMENT/SOCIAL WORK - FINANCIAL ASSISTANCE
[facility] provides services at a reduced cost to those who are determined to be eligible through [facility]’s financial assistance program. Information regarding [facility]’s financial assistance program can be found by going to [email or web address] or by calling [phone].

## 2025-07-04 NOTE — DISCHARGE NOTE PROVIDER - HOSPITAL COURSE
74M w/ hx of Rheumatoid arthritis and resultant peripheral neuropathy, CAD s/p stent 3 weeks prior, and HLD presenting as code stroke for vision loss. Patient reports waking up fine at 8AM by 9AM he was unable to see from his left eye which was not improving. Right eye unaffected. Otherwise patient denies any other weakness, sensory changes, discoordination. Of note the patient had a cardiac stent placed 3 weeks ago and has been taking Aspirin + Plavix. Reports that he has a known history of prior chronic infarct that was discovered ~6 months prior incidentally which began his current extensive cardiac workup.    Imaging:  : CT Brain Stroke Protocol  IMPRESSION:  Age-indeterminate left frontal infarct, consider MRI.   No acute intracranial hemorrhage.    CT Angio Brain Stroke Protocol  w/ IV Cont (07.03.25 @ 20:16)   IMPRESSION:  1.  Left P3 PCA clot.  2.  Right ICA origin stenosis, 50-60%.  3.  Left ICA origin stenosis, 40-50%.  4.  Moderate to severe left and moderate right vertebral origin stenoses.    CT Brain Perfusion Maps Stroke (07.03.25 @ 20:17)   IMPRESSION:  1.  Tmax abnormality in the inferior frontal lobes.  2.  No perfusion abnormalities in the left middle frontal gyrus      Impression: left eye CRAO etiology DUKE vs ESUS  Antithrombotic: ASA and Plavix ( recent coronary stent)  Will need outpatient  MRI HEAD/Orbits. Carotid Duplex and TTE  Based on imaging, possible ILR.    Patient walking unit without assistance and wants to be discharged today. His daughters are both nurse and will have work up done as outpatient.  Patient stable for discharge. 74M w/ hx of Rheumatoid arthritis and resultant peripheral neuropathy, CAD s/p stent 3 weeks prior, and HLD presenting as code stroke for vision loss. Patient reports waking up fine at 8AM by 9AM he was unable to see from his left eye which was not improving. Right eye unaffected. Otherwise patient denies any other weakness, sensory changes, discoordination. Of note the patient had a cardiac stent placed 3 weeks ago and has been taking Aspirin + Plavix. Reports that he has a known history of prior chronic infarct that was discovered ~6 months prior incidentally which began his current extensive cardiac workup.    Imaging:  : CT Brain Stroke Protocol  IMPRESSION:  Age-indeterminate left frontal infarct, consider MRI.   No acute intracranial hemorrhage.    CT Angio Brain Stroke Protocol  w/ IV Cont (07.03.25 @ 20:16)   IMPRESSION:  1.  Left P3 PCA clot.  2.  Right ICA origin stenosis, 50-60%.  3.  Left ICA origin stenosis, 40-50%.  4.  Moderate to severe left and moderate right vertebral origin stenoses.    CT Brain Perfusion Maps Stroke (07.03.25 @ 20:17)   IMPRESSION:  1.  Tmax abnormality in the inferior frontal lobes.  2.  No perfusion abnormalities in the left middle frontal gyrus      Impression: left eye CRAO etiology DUKE vs ESUS  Antithrombotic: ASA and Plavix ( recent coronary stent)  Will need outpatient Carotid Duplex and TTE  Based on imaging, possible ILR.    Patient walking unit without assistance and wants to be discharged today. His daughters are both nurse and will have work up done as outpatient.  Patient stable for discharge.

## 2025-07-04 NOTE — CONSULT NOTE ADULT - ASSESSMENT
74M on DAPT for CAD s/p stent (3 weeks ago), HLD, RA pw acute onset L eye loss of vision.    - MRI brain non-con  - carotid dopplers  - possible MICKI of L ICA stenosis   - SBP goals per vascular/neurology  - medical mgmt per vascular/neurology  - interventional plan pending conversation with vascular neurology

## 2025-07-04 NOTE — DISCHARGE NOTE NURSING/CASE MANAGEMENT/SOCIAL WORK - PATIENT PORTAL LINK FT
You can access the FollowMyHealth Patient Portal offered by [facility] by registering at the following website: [email or web address]. By joining [facility]’s FollowMyHealth portal, you will also be able to view your health information using other applications (apps) compatible with our system.

## 2025-07-04 NOTE — OCCUPATIONAL THERAPY INITIAL EVALUATION ADULT - NSOTDMEREC_GEN_A_CORE
Pt will need a rolling walker for 2/2 balance deficits & to increase safety during mobility for d/c./rolling walker

## 2025-07-04 NOTE — DISCHARGE NOTE PROVIDER - NSDCFUADDAPPT_GEN_ALL_CORE_FT
APPTS ARE READY TO BE MADE: [X] YES    Best Family or Patient Contact (if needed):    Additional Information about above appointments (if needed):    1: neurology  2: neuro-optho  3:     Other comments or requests:    APPTS ARE READY TO BE MADE: [X] YES    Best Family or Patient Contact (if needed):    Additional Information about above appointments (if needed):    1: neurology  2: neuro-optho  3:     Other comments or requests:       Provided patient with provider referral information, however patient prefers to schedule the appointments on their own.

## 2025-07-04 NOTE — DISCHARGE NOTE PROVIDER - CARE PROVIDER_API CALL
Lala Lakhani  Nurse Practitioner Family  [address], [address]  [place] [address]  Phone: [phone]  Fax: [phone]  Follow Up Time: 1 week    Hardy Vergara  Ophthalmology  [address], [facility] [address]  [place] [address]  Phone: [phone]  Fax: [phone]  Follow Up Time: 1 week

## 2025-07-07 ENCOUNTER — TRANSCRIPTION ENCOUNTER (OUTPATIENT)
Age: 75
End: 2025-07-07

## 2025-07-10 ENCOUNTER — APPOINTMENT (OUTPATIENT)
Dept: NEUROSURGERY | Facility: CLINIC | Age: 75
End: 2025-07-10
Payer: MEDICARE

## 2025-07-10 ENCOUNTER — NON-APPOINTMENT (OUTPATIENT)
Age: 75
End: 2025-07-10

## 2025-07-10 VITALS
OXYGEN SATURATION: 95 % | HEIGHT: 72 IN | WEIGHT: 203.5 LBS | SYSTOLIC BLOOD PRESSURE: 114 MMHG | HEART RATE: 84 BPM | DIASTOLIC BLOOD PRESSURE: 65 MMHG | BODY MASS INDEX: 27.56 KG/M2

## 2025-07-10 PROCEDURE — 99205 OFFICE O/P NEW HI 60 MIN: CPT

## 2025-07-18 ENCOUNTER — OUTPATIENT (OUTPATIENT)
Dept: OUTPATIENT SERVICES | Facility: HOSPITAL | Age: 75
LOS: 1 days | End: 2025-07-18
Payer: MEDICARE

## 2025-07-18 ENCOUNTER — APPOINTMENT (OUTPATIENT)
Dept: NEUROSURGERY | Facility: CLINIC | Age: 75
End: 2025-07-18

## 2025-07-18 ENCOUNTER — APPOINTMENT (OUTPATIENT)
Dept: ULTRASOUND IMAGING | Facility: CLINIC | Age: 75
End: 2025-07-18
Payer: MEDICARE

## 2025-07-18 DIAGNOSIS — Z96.653 PRESENCE OF ARTIFICIAL KNEE JOINT, BILATERAL: Chronic | ICD-10-CM

## 2025-07-18 DIAGNOSIS — I65.22 OCCLUSION AND STENOSIS OF LEFT CAROTID ARTERY: ICD-10-CM

## 2025-07-18 DIAGNOSIS — Z96.611 PRESENCE OF RIGHT ARTIFICIAL SHOULDER JOINT: Chronic | ICD-10-CM

## 2025-07-18 PROCEDURE — 99422 OL DIG E/M SVC 11-20 MIN: CPT

## 2025-07-18 PROCEDURE — 93880 EXTRACRANIAL BILAT STUDY: CPT

## 2025-07-18 PROCEDURE — 93880 EXTRACRANIAL BILAT STUDY: CPT | Mod: 26

## 2025-07-22 ENCOUNTER — INPATIENT (INPATIENT)
Facility: HOSPITAL | Age: 75
LOS: 0 days | Discharge: HOME | DRG: 68 | End: 2025-07-23
Attending: STUDENT IN AN ORGANIZED HEALTH CARE EDUCATION/TRAINING PROGRAM | Admitting: STUDENT IN AN ORGANIZED HEALTH CARE EDUCATION/TRAINING PROGRAM
Payer: MEDICARE

## 2025-07-22 ENCOUNTER — APPOINTMENT (OUTPATIENT)
Dept: NEUROSURGERY | Facility: HOSPITAL | Age: 75
End: 2025-07-22

## 2025-07-22 VITALS
TEMPERATURE: 98 F | HEART RATE: 74 BPM | DIASTOLIC BLOOD PRESSURE: 73 MMHG | RESPIRATION RATE: 16 BRPM | WEIGHT: 203.93 LBS | SYSTOLIC BLOOD PRESSURE: 146 MMHG | HEIGHT: 72 IN

## 2025-07-22 DIAGNOSIS — Z96.653 PRESENCE OF ARTIFICIAL KNEE JOINT, BILATERAL: Chronic | ICD-10-CM

## 2025-07-22 DIAGNOSIS — I65.22 OCCLUSION AND STENOSIS OF LEFT CAROTID ARTERY: ICD-10-CM

## 2025-07-22 DIAGNOSIS — Z96.611 PRESENCE OF RIGHT ARTIFICIAL SHOULDER JOINT: Chronic | ICD-10-CM

## 2025-07-22 LAB
BLD GP AB SCN SERPL QL: NEGATIVE — SIGNIFICANT CHANGE UP
PA ADP PRP-ACNC: 121 PRU — LOW (ref 182–335)
PLATELET RESPONSE ASPIRIN RESULT: 423 ARU — SIGNIFICANT CHANGE UP
RH IG SCN BLD-IMP: POSITIVE — SIGNIFICANT CHANGE UP
RH IG SCN BLD-IMP: POSITIVE — SIGNIFICANT CHANGE UP

## 2025-07-22 PROCEDURE — 85576 BLOOD PLATELET AGGREGATION: CPT

## 2025-07-22 PROCEDURE — 37215 TRANSCATH STENT CCA W/EPS: CPT | Mod: LT

## 2025-07-22 PROCEDURE — 99233 SBSQ HOSP IP/OBS HIGH 50: CPT | Mod: 24

## 2025-07-22 PROCEDURE — 36226 PLACE CATH VERTEBRAL ART: CPT | Mod: RT

## 2025-07-22 PROCEDURE — 86900 BLOOD TYPING SEROLOGIC ABO: CPT

## 2025-07-22 PROCEDURE — 86850 RBC ANTIBODY SCREEN: CPT

## 2025-07-22 PROCEDURE — 36223 PLACE CATH CAROTID/INOM ART: CPT | Mod: 59,RT

## 2025-07-22 PROCEDURE — 86901 BLOOD TYPING SEROLOGIC RH(D): CPT

## 2025-07-22 RX ORDER — LISINOPRIL 5 MG/1
40 TABLET ORAL DAILY
Refills: 0 | Status: DISCONTINUED | OUTPATIENT
Start: 2025-07-23 | End: 2025-07-23

## 2025-07-22 RX ORDER — TAMSULOSIN HYDROCHLORIDE 0.4 MG/1
0.8 CAPSULE ORAL AT BEDTIME
Refills: 0 | Status: DISCONTINUED | OUTPATIENT
Start: 2025-07-22 | End: 2025-07-23

## 2025-07-22 RX ORDER — LABETALOL HYDROCHLORIDE 200 MG/1
10 TABLET, FILM COATED ORAL ONCE
Refills: 0 | Status: COMPLETED | OUTPATIENT
Start: 2025-07-22 | End: 2025-07-22

## 2025-07-22 RX ORDER — ROSUVASTATIN CALCIUM 20 MG/1
10 TABLET, FILM COATED ORAL AT BEDTIME
Refills: 0 | Status: DISCONTINUED | OUTPATIENT
Start: 2025-07-22 | End: 2025-07-23

## 2025-07-22 RX ORDER — CLOPIDOGREL BISULFATE 75 MG/1
75 TABLET, FILM COATED ORAL
Refills: 0 | Status: DISCONTINUED | OUTPATIENT
Start: 2025-07-23 | End: 2025-07-23

## 2025-07-22 RX ORDER — FENTANYL CITRATE-0.9 % NACL/PF 100MCG/2ML
50 SYRINGE (ML) INTRAVENOUS ONCE
Refills: 0 | Status: DISCONTINUED | OUTPATIENT
Start: 2025-07-22 | End: 2025-07-22

## 2025-07-22 RX ORDER — LABETALOL HYDROCHLORIDE 200 MG/1
10 TABLET, FILM COATED ORAL ONCE
Refills: 0 | Status: DISCONTINUED | OUTPATIENT
Start: 2025-07-22 | End: 2025-07-23

## 2025-07-22 RX ORDER — LISINOPRIL 5 MG/1
20 TABLET ORAL DAILY
Refills: 0 | Status: DISCONTINUED | OUTPATIENT
Start: 2025-07-22 | End: 2025-07-22

## 2025-07-22 RX ORDER — LISINOPRIL 5 MG/1
20 TABLET ORAL DAILY
Refills: 0 | Status: DISCONTINUED | OUTPATIENT
Start: 2025-07-22 | End: 2025-07-23

## 2025-07-22 RX ORDER — PREGABALIN 50 MG/1
200 CAPSULE ORAL
Refills: 0 | Status: DISCONTINUED | OUTPATIENT
Start: 2025-07-22 | End: 2025-07-23

## 2025-07-22 RX ORDER — ONDANSETRON HCL/PF 4 MG/2 ML
4 VIAL (ML) INJECTION ONCE
Refills: 0 | Status: DISCONTINUED | OUTPATIENT
Start: 2025-07-22 | End: 2025-07-22

## 2025-07-22 RX ORDER — FENTANYL CITRATE-0.9 % NACL/PF 100MCG/2ML
25 SYRINGE (ML) INTRAVENOUS
Refills: 0 | Status: DISCONTINUED | OUTPATIENT
Start: 2025-07-22 | End: 2025-07-22

## 2025-07-22 RX ORDER — ACETAMINOPHEN 500 MG/5ML
1000 LIQUID (ML) ORAL ONCE
Refills: 0 | Status: COMPLETED | OUTPATIENT
Start: 2025-07-22 | End: 2025-07-22

## 2025-07-22 RX ORDER — ASPIRIN 325 MG
325 TABLET ORAL
Refills: 0 | Status: DISCONTINUED | OUTPATIENT
Start: 2025-07-23 | End: 2025-07-23

## 2025-07-22 RX ADMIN — Medication 1000 MILLIGRAM(S): at 20:42

## 2025-07-22 RX ADMIN — ROSUVASTATIN CALCIUM 10 MILLIGRAM(S): 20 TABLET, FILM COATED ORAL at 21:41

## 2025-07-22 RX ADMIN — LISINOPRIL 20 MILLIGRAM(S): 5 TABLET ORAL at 20:26

## 2025-07-22 RX ADMIN — Medication 10 MILLIGRAM(S): at 16:13

## 2025-07-22 RX ADMIN — LISINOPRIL 20 MILLIGRAM(S): 5 TABLET ORAL at 16:28

## 2025-07-22 RX ADMIN — TAMSULOSIN HYDROCHLORIDE 0.8 MILLIGRAM(S): 0.4 CAPSULE ORAL at 21:41

## 2025-07-22 RX ADMIN — LABETALOL HYDROCHLORIDE 10 MILLIGRAM(S): 200 TABLET, FILM COATED ORAL at 17:35

## 2025-07-22 RX ADMIN — PREGABALIN 200 MILLIGRAM(S): 50 CAPSULE ORAL at 21:42

## 2025-07-22 RX ADMIN — Medication 100 MILLILITER(S): at 16:30

## 2025-07-22 RX ADMIN — Medication 10 MILLIGRAM(S): at 20:15

## 2025-07-22 RX ADMIN — Medication 5 MILLIGRAM(S): at 14:20

## 2025-07-22 RX ADMIN — Medication 400 MILLIGRAM(S): at 20:27

## 2025-07-22 RX ADMIN — Medication 70 MILLILITER(S): at 14:53

## 2025-07-23 ENCOUNTER — TRANSCRIPTION ENCOUNTER (OUTPATIENT)
Age: 75
End: 2025-07-23

## 2025-07-23 VITALS — OXYGEN SATURATION: 91 % | HEART RATE: 109 BPM | RESPIRATION RATE: 22 BRPM

## 2025-07-23 LAB
ANION GAP SERPL CALC-SCNC: 15 MMOL/L — SIGNIFICANT CHANGE UP (ref 5–17)
BUN SERPL-MCNC: 8 MG/DL — SIGNIFICANT CHANGE UP (ref 7–23)
CALCIUM SERPL-MCNC: 9 MG/DL — SIGNIFICANT CHANGE UP (ref 8.4–10.5)
CHLORIDE SERPL-SCNC: 110 MMOL/L — HIGH (ref 96–108)
CO2 SERPL-SCNC: 18 MMOL/L — LOW (ref 22–31)
CREAT SERPL-MCNC: 0.72 MG/DL — SIGNIFICANT CHANGE UP (ref 0.5–1.3)
EGFR: 96 ML/MIN/1.73M2 — SIGNIFICANT CHANGE UP
EGFR: 96 ML/MIN/1.73M2 — SIGNIFICANT CHANGE UP
GLUCOSE SERPL-MCNC: 98 MG/DL — SIGNIFICANT CHANGE UP (ref 70–99)
HCT VFR BLD CALC: 36.4 % — LOW (ref 39–50)
HCT VFR BLD CALC: 37.4 % — LOW (ref 39–50)
HGB BLD-MCNC: 11.7 G/DL — LOW (ref 13–17)
HGB BLD-MCNC: 11.9 G/DL — LOW (ref 13–17)
MAGNESIUM SERPL-MCNC: 1.7 MG/DL — SIGNIFICANT CHANGE UP (ref 1.6–2.6)
MCHC RBC-ENTMCNC: 29.1 PG — SIGNIFICANT CHANGE UP (ref 27–34)
MCHC RBC-ENTMCNC: 29.3 PG — SIGNIFICANT CHANGE UP (ref 27–34)
MCHC RBC-ENTMCNC: 31.8 G/DL — LOW (ref 32–36)
MCHC RBC-ENTMCNC: 32.1 G/DL — SIGNIFICANT CHANGE UP (ref 32–36)
MCV RBC AUTO: 91 FL — SIGNIFICANT CHANGE UP (ref 80–100)
MCV RBC AUTO: 91.4 FL — SIGNIFICANT CHANGE UP (ref 80–100)
NRBC # BLD AUTO: 0 K/UL — SIGNIFICANT CHANGE UP (ref 0–0)
NRBC # BLD AUTO: 0 K/UL — SIGNIFICANT CHANGE UP (ref 0–0)
NRBC # FLD: 0 K/UL — SIGNIFICANT CHANGE UP (ref 0–0)
NRBC # FLD: 0 K/UL — SIGNIFICANT CHANGE UP (ref 0–0)
NRBC BLD AUTO-RTO: 0 /100 WBCS — SIGNIFICANT CHANGE UP (ref 0–0)
NRBC BLD AUTO-RTO: 0 /100 WBCS — SIGNIFICANT CHANGE UP (ref 0–0)
PHOSPHATE SERPL-MCNC: 4.1 MG/DL — SIGNIFICANT CHANGE UP (ref 2.5–4.5)
PLATELET # BLD AUTO: 183 K/UL — SIGNIFICANT CHANGE UP (ref 150–400)
PLATELET # BLD AUTO: 189 K/UL — SIGNIFICANT CHANGE UP (ref 150–400)
PMV BLD: 10.3 FL — SIGNIFICANT CHANGE UP (ref 7–13)
PMV BLD: 10.5 FL — SIGNIFICANT CHANGE UP (ref 7–13)
POTASSIUM SERPL-MCNC: 3.4 MMOL/L — LOW (ref 3.5–5.3)
POTASSIUM SERPL-SCNC: 3.4 MMOL/L — LOW (ref 3.5–5.3)
RBC # BLD: 4 M/UL — LOW (ref 4.2–5.8)
RBC # BLD: 4.09 M/UL — LOW (ref 4.2–5.8)
RBC # FLD: 12.8 % — SIGNIFICANT CHANGE UP (ref 10.3–14.5)
RBC # FLD: 12.9 % — SIGNIFICANT CHANGE UP (ref 10.3–14.5)
SODIUM SERPL-SCNC: 143 MMOL/L — SIGNIFICANT CHANGE UP (ref 135–145)
WBC # BLD: 5.79 K/UL — SIGNIFICANT CHANGE UP (ref 3.8–10.5)
WBC # BLD: 5.81 K/UL — SIGNIFICANT CHANGE UP (ref 3.8–10.5)
WBC # FLD AUTO: 5.79 K/UL — SIGNIFICANT CHANGE UP (ref 3.8–10.5)
WBC # FLD AUTO: 5.81 K/UL — SIGNIFICANT CHANGE UP (ref 3.8–10.5)

## 2025-07-23 PROCEDURE — C1894: CPT

## 2025-07-23 PROCEDURE — 84100 ASSAY OF PHOSPHORUS: CPT

## 2025-07-23 PROCEDURE — 85027 COMPLETE CBC AUTOMATED: CPT

## 2025-07-23 PROCEDURE — 86901 BLOOD TYPING SEROLOGIC RH(D): CPT

## 2025-07-23 PROCEDURE — C1876: CPT

## 2025-07-23 PROCEDURE — 80048 BASIC METABOLIC PNL TOTAL CA: CPT

## 2025-07-23 PROCEDURE — 93880 EXTRACRANIAL BILAT STUDY: CPT

## 2025-07-23 PROCEDURE — 97161 PT EVAL LOW COMPLEX 20 MIN: CPT

## 2025-07-23 PROCEDURE — 36415 COLL VENOUS BLD VENIPUNCTURE: CPT

## 2025-07-23 PROCEDURE — 86850 RBC ANTIBODY SCREEN: CPT

## 2025-07-23 PROCEDURE — C1725: CPT

## 2025-07-23 PROCEDURE — C1884: CPT

## 2025-07-23 PROCEDURE — 86900 BLOOD TYPING SEROLOGIC ABO: CPT

## 2025-07-23 PROCEDURE — 99232 SBSQ HOSP IP/OBS MODERATE 35: CPT | Mod: 24

## 2025-07-23 PROCEDURE — 93880 EXTRACRANIAL BILAT STUDY: CPT | Mod: 26

## 2025-07-23 PROCEDURE — C1887: CPT

## 2025-07-23 PROCEDURE — C1769: CPT

## 2025-07-23 PROCEDURE — 37215 TRANSCATH STENT CCA W/EPS: CPT

## 2025-07-23 PROCEDURE — 83735 ASSAY OF MAGNESIUM: CPT

## 2025-07-23 PROCEDURE — C1760: CPT

## 2025-07-23 PROCEDURE — 85576 BLOOD PLATELET AGGREGATION: CPT

## 2025-07-23 RX ORDER — LISINOPRIL 5 MG/1
20 TABLET ORAL
Refills: 0 | DISCHARGE

## 2025-07-23 RX ORDER — TAMSULOSIN HYDROCHLORIDE 0.4 MG/1
2 CAPSULE ORAL
Qty: 0 | Refills: 0 | DISCHARGE
Start: 2025-07-23

## 2025-07-23 RX ORDER — LISINOPRIL 5 MG/1
1 TABLET ORAL
Qty: 0 | Refills: 0 | DISCHARGE
Start: 2025-07-23

## 2025-07-23 RX ORDER — FOLIC ACID 1 MG/1
1 TABLET ORAL
Refills: 0 | DISCHARGE

## 2025-07-23 RX ORDER — CLOPIDOGREL BISULFATE 75 MG/1
1 TABLET, FILM COATED ORAL
Qty: 0 | Refills: 0 | DISCHARGE
Start: 2025-07-23

## 2025-07-23 RX ORDER — ASPIRIN 325 MG
1 TABLET ORAL
Qty: 0 | Refills: 0 | DISCHARGE
Start: 2025-07-23

## 2025-07-23 RX ORDER — CLOPIDOGREL BISULFATE 75 MG/1
1 TABLET, FILM COATED ORAL
Refills: 0 | DISCHARGE

## 2025-07-23 RX ORDER — MAGNESIUM SULFATE 500 MG/ML
2 SYRINGE (ML) INJECTION ONCE
Refills: 0 | Status: COMPLETED | OUTPATIENT
Start: 2025-07-23 | End: 2025-07-23

## 2025-07-23 RX ADMIN — CLOPIDOGREL BISULFATE 75 MILLIGRAM(S): 75 TABLET, FILM COATED ORAL at 05:14

## 2025-07-23 RX ADMIN — Medication 40 MILLIEQUIVALENT(S): at 15:16

## 2025-07-23 RX ADMIN — PREGABALIN 200 MILLIGRAM(S): 50 CAPSULE ORAL at 05:15

## 2025-07-23 RX ADMIN — Medication 325 MILLIGRAM(S): at 05:14

## 2025-07-23 RX ADMIN — Medication 25 GRAM(S): at 05:15

## 2025-07-23 RX ADMIN — LISINOPRIL 40 MILLIGRAM(S): 5 TABLET ORAL at 05:14

## 2025-07-23 RX ADMIN — Medication 40 MILLIEQUIVALENT(S): at 05:15

## 2025-08-07 ENCOUNTER — APPOINTMENT (OUTPATIENT)
Dept: NEUROSURGERY | Facility: CLINIC | Age: 75
End: 2025-08-07
Payer: MEDICARE

## 2025-08-07 ENCOUNTER — NON-APPOINTMENT (OUTPATIENT)
Age: 75
End: 2025-08-07

## 2025-08-07 VITALS
DIASTOLIC BLOOD PRESSURE: 75 MMHG | HEIGHT: 72 IN | SYSTOLIC BLOOD PRESSURE: 129 MMHG | BODY MASS INDEX: 27.77 KG/M2 | OXYGEN SATURATION: 95 % | RESPIRATION RATE: 16 BRPM | WEIGHT: 205 LBS | HEART RATE: 93 BPM

## 2025-08-07 DIAGNOSIS — I10 ESSENTIAL (PRIMARY) HYPERTENSION: ICD-10-CM

## 2025-08-07 DIAGNOSIS — Z86.79 PERSONAL HISTORY OF OTHER DISEASES OF THE CIRCULATORY SYSTEM: ICD-10-CM

## 2025-08-07 PROCEDURE — 99213 OFFICE O/P EST LOW 20 MIN: CPT

## 2025-08-07 RX ORDER — TAMSULOSIN HYDROCHLORIDE 0.4 MG/1
CAPSULE ORAL
Refills: 0 | Status: ACTIVE | COMMUNITY

## 2025-08-07 RX ORDER — ROSUVASTATIN CALCIUM 10 MG/1
10 TABLET, FILM COATED ORAL
Refills: 0 | Status: ACTIVE | COMMUNITY

## 2025-08-07 RX ORDER — LISINOPRIL 20 MG/1
20 TABLET ORAL
Refills: 0 | Status: ACTIVE | COMMUNITY

## 2025-08-07 RX ORDER — CLOPIDOGREL 75 MG/1
TABLET, FILM COATED ORAL
Refills: 0 | Status: ACTIVE | COMMUNITY

## 2025-08-09 PROBLEM — I65.22 CAROTID STENOSIS, LEFT: Status: ACTIVE | Noted: 2025-07-10

## (undated) DEVICE — DRSG MASTISOL

## (undated) DEVICE — FRAZIER SUCTION TIP 8FR

## (undated) DEVICE — ELCTR ROCKER SWITCH PENCIL BLUE 10FT

## (undated) DEVICE — DRSG ACE BANDAGE 2"

## (undated) DEVICE — DRSG COBAN 4"

## (undated) DEVICE — SUT VICRYL 0 27" CT

## (undated) DEVICE — GLV 7 PROTEXIS (WHITE)

## (undated) DEVICE — DRSG ACE BANDAGE 4"

## (undated) DEVICE — SUT VICRYL 3-0 18" SH (POP-OFF)

## (undated) DEVICE — ELCTR GROUNDING PAD ADULT COVIDIEN

## (undated) DEVICE — WARMING BLANKET LOWER ADULT

## (undated) DEVICE — PREP CHLORAPREP HI-LITE ORANGE 26ML

## (undated) DEVICE — DRSG STOCKINETTE CLOTH 4 X 36"

## (undated) DEVICE — SUT MONOCRYL 4-0 18" PS-2

## (undated) DEVICE — DRSG CAST PLASTER GYPSONA FAST 4"

## (undated) DEVICE — POSITIONER PATIENT SAFETY STRAP 3X60"

## (undated) DEVICE — VENODYNE/SCD SLEEVE CALF MEDIUM

## (undated) DEVICE — SOL IRR POUR NS 0.9% 500ML

## (undated) DEVICE — DRAPE C ARM MINI

## (undated) DEVICE — DRSG WEBRIL 4"

## (undated) DEVICE — DRSG DERMABOND 0.7ML

## (undated) DEVICE — POSITIONER FOAM EGG CRATE ULNAR 2PCS (PINK)

## (undated) DEVICE — PACK HAND TRAY

## (undated) DEVICE — DRSG STERISTRIPS 0.5 X 4"